# Patient Record
Sex: MALE | Race: WHITE | NOT HISPANIC OR LATINO | Employment: FULL TIME | URBAN - NONMETROPOLITAN AREA
[De-identification: names, ages, dates, MRNs, and addresses within clinical notes are randomized per-mention and may not be internally consistent; named-entity substitution may affect disease eponyms.]

---

## 2024-04-04 ENCOUNTER — OFFICE VISIT (OUTPATIENT)
Dept: INTERNAL MEDICINE | Facility: CLINIC | Age: 34
End: 2024-04-04
Payer: COMMERCIAL

## 2024-04-04 VITALS
SYSTOLIC BLOOD PRESSURE: 119 MMHG | WEIGHT: 186.12 LBS | HEART RATE: 67 BPM | OXYGEN SATURATION: 99 % | DIASTOLIC BLOOD PRESSURE: 62 MMHG | BODY MASS INDEX: 26.06 KG/M2 | TEMPERATURE: 96.6 F | HEIGHT: 71 IN

## 2024-04-04 DIAGNOSIS — Z00.00 ANNUAL PHYSICAL EXAM: Primary | ICD-10-CM

## 2024-04-04 DIAGNOSIS — Z11.59 ENCOUNTER FOR HEPATITIS C SCREENING TEST FOR LOW RISK PATIENT: ICD-10-CM

## 2024-04-04 DIAGNOSIS — Z72.0 TOBACCO ABUSE: ICD-10-CM

## 2024-04-04 DIAGNOSIS — Z13.29 SCREENING FOR ENDOCRINE, METABOLIC AND IMMUNITY DISORDER: ICD-10-CM

## 2024-04-04 DIAGNOSIS — Z13.0 ENCOUNTER FOR SCREENING FOR DISEASES OF THE BLOOD AND BLOOD-FORMING ORGANS AND CERTAIN DISORDERS INVOLVING THE IMMUNE MECHANISM: ICD-10-CM

## 2024-04-04 DIAGNOSIS — Z13.228 SCREENING FOR ENDOCRINE, METABOLIC AND IMMUNITY DISORDER: ICD-10-CM

## 2024-04-04 DIAGNOSIS — B35.4 TINEA CORPORIS: ICD-10-CM

## 2024-04-04 DIAGNOSIS — E66.3 OVERWEIGHT (BMI 25.0-29.9): ICD-10-CM

## 2024-04-04 DIAGNOSIS — Z13.0 SCREENING FOR ENDOCRINE, METABOLIC AND IMMUNITY DISORDER: ICD-10-CM

## 2024-04-04 RX ORDER — CLOTRIMAZOLE 1 %
1 CREAM (GRAM) TOPICAL 2 TIMES DAILY
Qty: 85 G | Refills: 0 | Status: SHIPPED | OUTPATIENT
Start: 2024-04-04

## 2024-04-04 NOTE — PROGRESS NOTES
Subjective   Jose Lomax is a 34 y.o. male and is here for a comprehensive physical exam. The patient reports problems - rash .    HPI: here today for annual physical exam and complaints of rash.   Rash started about 2 weeks ago.   It is erythematous and spreads over bilateral lower extremities below the knees.   No recent travel, does not work in agriculture or exposed to any poison Ivy. Household contacts do not have a similar rash.   Denies pruritus, pain, drainage, and hx of similar rash.   He has not tried anything for this problem.   No other pertinent medical history.   He does use marijuana and vapes nicotine. No other illicit drug or alcohol use.     Health Habits:  Eye exam within last 2 years? Yes.   Dental exam every 6 months? No, will schedule.   Exercise habits: no structured exercise  Healthy diet? Typical american diet     The ASCVD Risk score (Leonora ADAMES, et al., 2019) failed to calculate for the following reasons:    The 2019 ASCVD risk score is only valid for ages 40 to 79      Do you take any herbs or supplements that were not prescribed by a doctor? no  Are you taking calcium supplements? No  Are you taking aspirin daily? No     History:  Patient receives prostate care here: Yes  Family history of prostate cancer: no.     reports being sexually active and has had partner(s) who are female.    The following portions of the patient's history were reviewed and updated as appropriate: He  has a past medical history of ADHD (attention deficit hyperactivity disorder) and Anxiety.  He does not have a problem list on file.  He  has a past surgical history that includes Tonsillectomy; Adenoidectomy; and [place] tooth extraction (Bilateral).  His family history includes No Known Problems in his father and mother.  He  reports that he has quit smoking. His smoking use included cigarettes. He started smoking about 17 years ago. He has a 16 pack-year smoking history. He has been exposed to tobacco  "smoke. He has never used smokeless tobacco. He reports current alcohol use. He reports current drug use. Drug: Marijuana.  No current outpatient medications on file.     No current facility-administered medications for this visit.     Objective   /62   Pulse 67   Temp 96.6 °F (35.9 °C) (Tympanic)   Ht 180.3 cm (71\")   Wt 84.4 kg (186 lb 1.9 oz)   SpO2 99%   BMI 25.96 kg/m²   Physical Exam  Vitals and nursing note reviewed.   Constitutional:       General: He is not in acute distress.     Appearance: Normal appearance.   HENT:      Right Ear: Tympanic membrane and ear canal normal.      Left Ear: Tympanic membrane and ear canal normal.      Nose: Nose normal.      Mouth/Throat:      Mouth: Mucous membranes are moist.      Pharynx: No posterior oropharyngeal erythema.   Eyes:      Extraocular Movements: Extraocular movements intact.      Right eye: No nystagmus.      Left eye: No nystagmus.      Conjunctiva/sclera: Conjunctivae normal.      Pupils: Pupils are equal, round, and reactive to light.   Neck:      Thyroid: No thyroid mass or thyromegaly.      Vascular: No carotid bruit.   Cardiovascular:      Rate and Rhythm: Normal rate and regular rhythm.      Pulses: Normal pulses.      Heart sounds: Normal heart sounds. No murmur heard.  Pulmonary:      Effort: Pulmonary effort is normal.      Breath sounds: Normal breath sounds. No wheezing.   Abdominal:      General: Bowel sounds are normal. There is no distension.      Palpations: Abdomen is soft.      Tenderness: There is no abdominal tenderness.      Hernia: No hernia is present.   Musculoskeletal:         General: No tenderness or deformity.      Cervical back: Normal range of motion and neck supple. No muscular tenderness.      Right lower leg: No edema.      Left lower leg: No edema.   Lymphadenopathy:      Head:      Right side of head: No submandibular, tonsillar, preauricular or posterior auricular adenopathy.      Left side of head: No " submandibular, tonsillar, preauricular or posterior auricular adenopathy.      Cervical: No cervical adenopathy.   Skin:     General: Skin is warm and dry.      Capillary Refill: Capillary refill takes less than 2 seconds.      Findings: Rash (erytheamtous plaques bilateral lower extremities below the knee with rolled border) present. No lesion.   Neurological:      General: No focal deficit present.      Mental Status: He is alert and oriented to person, place, and time.      Coordination: Coordination is intact.      Gait: Gait is intact.      Deep Tendon Reflexes: Reflexes normal.   Psychiatric:         Mood and Affect: Mood normal.         Behavior: Behavior normal.     Assessment & Plan   Healthy male exam.    Diagnosis Plan   1. Annual physical exam  Preventative maintenance discussed during visit and information provided in AVS.       2. Tinea corporis  CBC No Differential    Comprehensive metabolic panel    Lipid panel    Recommend clotrimazole BID until gone. Follow-up if not improving.       3. Tobacco abuse  Jose Lomax  reports that he has quit smoking. His smoking use included cigarettes. He started smoking about 17 years ago. He has a 16 pack-year smoking history. He has been exposed to tobacco smoke. He has never used smokeless tobacco. I have educated him on the risk of diseases from using tobacco products such as cancer, COPD, and heart disease.     I advised him to quit and he is not willing to quit.    I spent 4 minutes counseling the patient.             4. Overweight (BMI 25.0-29.9)  CBC No Differential    Comprehensive metabolic panel    Lipid panel    Healthy diet and exercise encourage.d       5. Screening for endocrine, metabolic and immunity disorder  CBC No Differential    Comprehensive metabolic panel    Lipid panel      6. Encounter for screening for diseases of the blood and blood-forming organs and certain disorders involving the immune mechanism  CBC No Differential     Comprehensive metabolic panel    Lipid panel      7. Encounter for hepatitis C screening test for low risk patient  CBC No Differential    Comprehensive metabolic panel    Lipid panel    Hepatitis C antibody          2. Patient Counseling:  --Nutrition: Stressed importance of moderation in sodium/caffeine intake, saturated fat and cholesterol, caloric balance, sufficient intake of fresh fruits, vegetables, fiber, calcium and iron.  --Discussed the issue of calcium supplement, and the daily use of baby aspirin if applicable.  --Exercise: Stressed the importance of regular exercise.   --Substance Abuse: Discussed cessation/primary prevention of tobacco (if applicable, alcohol, or other drug use (if applicable); driving or other dangerous activities under the influence; availability of treatment for abuse.    --Sexuality: Discussed sexually transmitted diseases, partner selection, use of condoms, avoidance of unintended pregnancy  and contraceptive alternatives.   --Injury prevention: Discussed safety belts, safety helmets, smoke detector, smoking near bedding or upholstery.   --Dental health: Discussed importance of regular tooth brushing, flossing, and dental visits.  --Immunizations reviewed.  --Discussed benefits of screening colonoscopy (if applicable).  --After hours service discussed with patient.  3. Discussed the patient's BMI with him.  The BMI is above average; BMI management plan is completed  4. Return in about 1 year (around 4/4/2025) for Annual.  HERBER Villanueva  04/04/2024  11:20 EDT

## 2024-04-05 LAB
ALBUMIN SERPL-MCNC: 4.7 G/DL (ref 3.5–5.2)
ALBUMIN/GLOB SERPL: 1.8 G/DL
ALP SERPL-CCNC: 72 U/L (ref 39–117)
ALT SERPL-CCNC: 14 U/L (ref 1–41)
AST SERPL-CCNC: 16 U/L (ref 1–40)
BILIRUB SERPL-MCNC: 0.4 MG/DL (ref 0–1.2)
BUN SERPL-MCNC: 11 MG/DL (ref 6–20)
BUN/CREAT SERPL: 8.9 (ref 7–25)
CALCIUM SERPL-MCNC: 9.5 MG/DL (ref 8.6–10.5)
CHLORIDE SERPL-SCNC: 104 MMOL/L (ref 98–107)
CHOLEST SERPL-MCNC: 204 MG/DL (ref 0–200)
CO2 SERPL-SCNC: 26.8 MMOL/L (ref 22–29)
CREAT SERPL-MCNC: 1.23 MG/DL (ref 0.76–1.27)
EGFRCR SERPLBLD CKD-EPI 2021: 80 ML/MIN/1.73
ERYTHROCYTE [DISTWIDTH] IN BLOOD BY AUTOMATED COUNT: 12.6 % (ref 12.3–15.4)
GLOBULIN SER CALC-MCNC: 2.6 GM/DL
GLUCOSE SERPL-MCNC: 94 MG/DL (ref 65–99)
HCT VFR BLD AUTO: 43.8 % (ref 37.5–51)
HCV IGG SERPL QL IA: NON REACTIVE
HDLC SERPL-MCNC: 42 MG/DL (ref 40–60)
HGB BLD-MCNC: 15 G/DL (ref 13–17.7)
LDLC SERPL CALC-MCNC: 147 MG/DL (ref 0–100)
MCH RBC QN AUTO: 30 PG (ref 26.6–33)
MCHC RBC AUTO-ENTMCNC: 34.2 G/DL (ref 31.5–35.7)
MCV RBC AUTO: 87.6 FL (ref 79–97)
PLATELET # BLD AUTO: 228 10*3/MM3 (ref 140–450)
POTASSIUM SERPL-SCNC: 4 MMOL/L (ref 3.5–5.2)
PROT SERPL-MCNC: 7.3 G/DL (ref 6–8.5)
RBC # BLD AUTO: 5 10*6/MM3 (ref 4.14–5.8)
SODIUM SERPL-SCNC: 141 MMOL/L (ref 136–145)
TRIGL SERPL-MCNC: 84 MG/DL (ref 0–150)
VLDLC SERPL CALC-MCNC: 15 MG/DL (ref 5–40)
WBC # BLD AUTO: 6.78 10*3/MM3 (ref 3.4–10.8)

## 2024-05-02 ENCOUNTER — TELEPHONE (OUTPATIENT)
Dept: INTERNAL MEDICINE | Facility: CLINIC | Age: 34
End: 2024-05-02
Payer: COMMERCIAL

## 2024-05-02 RX ORDER — CLOTRIMAZOLE 1 %
1 CREAM (GRAM) TOPICAL 2 TIMES DAILY
Qty: 85 G | Refills: 0 | Status: SHIPPED | OUTPATIENT
Start: 2024-05-02

## 2024-05-02 NOTE — TELEPHONE ENCOUNTER
Caller: Jose Lomax    Relationship: Self    Best call back number: [phone]    Requested Prescriptions:   Requested Prescriptions      No prescriptions requested or ordered in this encounter      clotrimazole (LOTRIMIN) 1 % cream     Pharmacy where request should be sent:      Last office visit with prescribing clinician: 4/4/2024   Last telemedicine visit with prescribing clinician: Visit date not found   Next office visit with prescribing clinician: Visit date not found     Additional details provided by patient:     WAS SENT IN 4/4/24 PATIENT FORGOT ABOUT.  CAN WE SEND BACK IN BECAUSE PHARMACY DOES NOT HAVE THAT ORIGINAL REQUEST.  MYCHART STATES HE HAD RASH FOR TWO WEEKS BEFORE APPOINTMENT.  PATIENT STATES HE HAS HAD RASH SINCE AUGUST LAST YEAR    Does the patient have less than a 3 day supply:  [x] Yes  [] No    Would you like a call back once the refill request has been completed: [] Yes [x] No    If the office needs to give you a call back, can they leave a voicemail: [] Yes [x] No    Christine Cosme, PCT   05/02/24 14:25 EDT

## 2024-06-06 ENCOUNTER — OFFICE VISIT (OUTPATIENT)
Dept: INTERNAL MEDICINE | Facility: CLINIC | Age: 34
End: 2024-06-06
Payer: COMMERCIAL

## 2024-06-06 VITALS
OXYGEN SATURATION: 98 % | HEART RATE: 63 BPM | DIASTOLIC BLOOD PRESSURE: 68 MMHG | HEIGHT: 71 IN | SYSTOLIC BLOOD PRESSURE: 106 MMHG | RESPIRATION RATE: 16 BRPM | BODY MASS INDEX: 24.5 KG/M2 | WEIGHT: 175 LBS

## 2024-06-06 DIAGNOSIS — M62.89 PELVIC FLOOR DYSFUNCTION: ICD-10-CM

## 2024-06-06 DIAGNOSIS — L30.8 OTHER ECZEMA: Primary | ICD-10-CM

## 2024-06-06 PROBLEM — L30.9 ECZEMA: Status: ACTIVE | Noted: 2024-06-06

## 2024-06-06 PROCEDURE — 99214 OFFICE O/P EST MOD 30 MIN: CPT | Performed by: STUDENT IN AN ORGANIZED HEALTH CARE EDUCATION/TRAINING PROGRAM

## 2024-06-06 RX ORDER — TRIAMCINOLONE ACETONIDE 1 MG/G
1 CREAM TOPICAL 2 TIMES DAILY
Qty: 80 G | Refills: 0 | Status: SHIPPED | OUTPATIENT
Start: 2024-06-06

## 2024-06-06 NOTE — PROGRESS NOTES
"    Office Note     Name: Jose Lomax    : 1990     MRN: [id number]     Chief Complaint  Rash (Started on legs around September, spread to arms) and urinary problem    Subjective     History of Present Illness:  Jose Lomax is a 34 y.o. male who presents today for rash and urinary frequency.    Rash started in September from legs to arms, noted to be red and occasionally itching.  Urinary problem: Feel like he has weak stream upon urination, occasionally had urinary frequency but now has resolved. But no dysuria, urgency, pain or cramping.       Family History:   Family History   Problem Relation Age of Onset    No Known Problems Mother     No Known Problems Father        Social History:   Social History     Socioeconomic History    Marital status:    Tobacco Use    Smoking status: Former     Average packs/day: 1 pack/day for 16.0 years (16.0 ttl pk-yrs)     Types: Cigarettes     Start date:      Passive exposure: Past    Smokeless tobacco: Never   Vaping Use    Vaping status: Every Day    Substances: Nicotine    Devices: Disposable   Substance and Sexual Activity    Alcohol use: Yes    Drug use: Yes     Types: Marijuana    Sexual activity: Yes     Partners: Female       Health Maintenance   Topic Date Due    COVID-19 Vaccine ( - - season) 2025 (Originally 2023)    INFLUENZA VACCINE  2024    TDAP/TD VACCINES (2 - Td or Tdap) 2025    ANNUAL PHYSICAL  2025    BMI FOLLOWUP  2025    HEPATITIS C SCREENING  Completed    Pneumococcal Vaccine 0-64  Aged Out       Objective     Vital Signs  /68   Pulse 63   Resp 16   Ht 180.3 cm (70.98\")   Wt 79.4 kg (175 lb)   SpO2 98%   BMI 24.42 kg/m²   Estimated body mass index is 24.42 kg/m² as calculated from the following:    Height as of this encounter: 180.3 cm (70.98\").    Weight as of this encounter: 79.4 kg (175 lb).  BMI is within normal parameters. No other follow-up for BMI required.    Physical " Exam  Vitals reviewed.   HENT:      Head: Normocephalic.   Genitourinary:     Comments: No CVA tenderness  Musculoskeletal:      Comments: Multiple matches and papules on both lower legs and some on arms, prupritic   Neurological:      Mental Status: He is alert.          Procedures     Assessment and Plan     Diagnoses and all orders for this visit:    1. Other eczema (Primary)  Assessment & Plan:  Start triamcinolone cream on extremities    Orders:  -     triamcinolone (KENALOG) 0.1 % cream; Apply 1 Application topically to the appropriate area as directed 2 (Two) Times a Day.  Dispense: 80 g; Refill: 0    2. Pelvic floor dysfunction  Assessment & Plan:  Given patient's age, unlikely to be prostate related  Given chronicity and absence of systemic symptoms, unlikely to be an infection.   Offered physical therapy for pelvic floor dysfunction, pt refused           Counseling was given to patient for the following topics: instructions for management, risks and benefits of treatment options, and importance of treatment compliance.    Follow Up  No follow-ups on file.    MD DAINA Womack [facility]  [address] [place] [id number] [phone]

## 2024-06-06 NOTE — ASSESSMENT & PLAN NOTE
Given patient's age, unlikely to be prostate related  Given chronicity and absence of systemic symptoms, unlikely to be an infection.   Offered physical therapy for pelvic floor dysfunction, pt refused

## 2024-09-01 ENCOUNTER — HOSPITAL ENCOUNTER (EMERGENCY)
Facility: HOSPITAL | Age: 34
Discharge: HOME | End: 2024-09-01
Attending: STUDENT IN AN ORGANIZED HEALTH CARE EDUCATION/TRAINING PROGRAM
Payer: COMMERCIAL

## 2024-09-01 ENCOUNTER — APPOINTMENT (OUTPATIENT)
Dept: CT IMAGING | Facility: HOSPITAL | Age: 34
End: 2024-09-01
Payer: COMMERCIAL

## 2024-09-01 ENCOUNTER — APPOINTMENT (OUTPATIENT)
Dept: GENERAL RADIOLOGY | Facility: HOSPITAL | Age: 34
End: 2024-09-01
Payer: COMMERCIAL

## 2024-09-01 VITALS
WEIGHT: 170 LBS | SYSTOLIC BLOOD PRESSURE: 105 MMHG | RESPIRATION RATE: 16 BRPM | HEIGHT: 71 IN | OXYGEN SATURATION: 99 % | HEART RATE: 44 BPM | DIASTOLIC BLOOD PRESSURE: 64 MMHG | TEMPERATURE: 97.5 F | BODY MASS INDEX: 23.8 KG/M2

## 2024-09-01 DIAGNOSIS — M54.6 ACUTE LEFT-SIDED THORACIC BACK PAIN: Primary | ICD-10-CM

## 2024-09-01 DIAGNOSIS — R00.1 SINUS BRADYCARDIA: ICD-10-CM

## 2024-09-01 LAB
ALBUMIN SERPL-MCNC: 4.5 G/DL (ref 3.5–5.2)
ALBUMIN/GLOB SERPL: 1.6 G/DL
ALP SERPL-CCNC: 54 U/L (ref 39–117)
ALT SERPL W P-5'-P-CCNC: 10 U/L (ref 1–41)
ANION GAP SERPL CALCULATED.3IONS-SCNC: 8.7 MMOL/L (ref 5–15)
AST SERPL-CCNC: 14 U/L (ref 1–40)
BASOPHILS # BLD AUTO: 0.09 10*3/MM3 (ref 0–0.2)
BASOPHILS NFR BLD AUTO: 1 % (ref 0–1.5)
BILIRUB SERPL-MCNC: 0.3 MG/DL (ref 0–1.2)
BUN SERPL-MCNC: 13 MG/DL (ref 6–20)
BUN/CREAT SERPL: 11 (ref 7–25)
CALCIUM SPEC-SCNC: 9 MG/DL (ref 8.6–10.5)
CHLORIDE SERPL-SCNC: 101 MMOL/L (ref 98–107)
CO2 SERPL-SCNC: 26.3 MMOL/L (ref 22–29)
CREAT SERPL-MCNC: 1.18 MG/DL (ref 0.76–1.27)
DEPRECATED RDW RBC AUTO: 37.5 FL (ref 37–54)
EGFRCR SERPLBLD CKD-EPI 2021: 84.1 ML/MIN/1.73
EOSINOPHIL # BLD AUTO: 0.12 10*3/MM3 (ref 0–0.4)
EOSINOPHIL NFR BLD AUTO: 1.3 % (ref 0.3–6.2)
ERYTHROCYTE [DISTWIDTH] IN BLOOD BY AUTOMATED COUNT: 12.1 % (ref 12.3–15.4)
GLOBULIN UR ELPH-MCNC: 2.8 GM/DL
GLUCOSE SERPL-MCNC: 139 MG/DL (ref 65–99)
HCT VFR BLD AUTO: 40.7 % (ref 37.5–51)
HGB BLD-MCNC: 14.1 G/DL (ref 13–17.7)
HOLD SPECIMEN: NORMAL
HOLD SPECIMEN: NORMAL
IMM GRANULOCYTES # BLD AUTO: 0.02 10*3/MM3 (ref 0–0.05)
IMM GRANULOCYTES NFR BLD AUTO: 0.2 % (ref 0–0.5)
LYMPHOCYTES # BLD AUTO: 4.01 10*3/MM3 (ref 0.7–3.1)
LYMPHOCYTES NFR BLD AUTO: 44.5 % (ref 19.6–45.3)
MAGNESIUM SERPL-MCNC: 1.9 MG/DL (ref 1.6–2.6)
MCH RBC QN AUTO: 29.6 PG (ref 26.6–33)
MCHC RBC AUTO-ENTMCNC: 34.6 G/DL (ref 31.5–35.7)
MCV RBC AUTO: 85.3 FL (ref 79–97)
MONOCYTES # BLD AUTO: 0.81 10*3/MM3 (ref 0.1–0.9)
MONOCYTES NFR BLD AUTO: 9 % (ref 5–12)
NEUTROPHILS NFR BLD AUTO: 3.97 10*3/MM3 (ref 1.7–7)
NEUTROPHILS NFR BLD AUTO: 44 % (ref 42.7–76)
NRBC BLD AUTO-RTO: 0 /100 WBC (ref 0–0.2)
PLATELET # BLD AUTO: 254 10*3/MM3 (ref 140–450)
PMV BLD AUTO: 10.4 FL (ref 6–12)
POTASSIUM SERPL-SCNC: 3.4 MMOL/L (ref 3.5–5.2)
PROT SERPL-MCNC: 7.3 G/DL (ref 6–8.5)
RBC # BLD AUTO: 4.77 10*6/MM3 (ref 4.14–5.8)
SODIUM SERPL-SCNC: 136 MMOL/L (ref 136–145)
TROPONIN T SERPL HS-MCNC: <6 NG/L
WBC NRBC COR # BLD AUTO: 9.02 10*3/MM3 (ref 3.4–10.8)
WHOLE BLOOD HOLD COAG: NORMAL
WHOLE BLOOD HOLD SPECIMEN: NORMAL

## 2024-09-01 PROCEDURE — 83735 ASSAY OF MAGNESIUM: CPT | Performed by: STUDENT IN AN ORGANIZED HEALTH CARE EDUCATION/TRAINING PROGRAM

## 2024-09-01 PROCEDURE — 96374 THER/PROPH/DIAG INJ IV PUSH: CPT

## 2024-09-01 PROCEDURE — 72128 CT CHEST SPINE W/O DYE: CPT

## 2024-09-01 PROCEDURE — 71275 CT ANGIOGRAPHY CHEST: CPT

## 2024-09-01 PROCEDURE — 96361 HYDRATE IV INFUSION ADD-ON: CPT

## 2024-09-01 PROCEDURE — 80053 COMPREHEN METABOLIC PANEL: CPT | Performed by: STUDENT IN AN ORGANIZED HEALTH CARE EDUCATION/TRAINING PROGRAM

## 2024-09-01 PROCEDURE — 96375 TX/PRO/DX INJ NEW DRUG ADDON: CPT

## 2024-09-01 PROCEDURE — 25010000002 MORPHINE PER 10 MG: Performed by: STUDENT IN AN ORGANIZED HEALTH CARE EDUCATION/TRAINING PROGRAM

## 2024-09-01 PROCEDURE — 84484 ASSAY OF TROPONIN QUANT: CPT | Performed by: STUDENT IN AN ORGANIZED HEALTH CARE EDUCATION/TRAINING PROGRAM

## 2024-09-01 PROCEDURE — 25810000003 SODIUM CHLORIDE 0.9 % SOLUTION: Performed by: STUDENT IN AN ORGANIZED HEALTH CARE EDUCATION/TRAINING PROGRAM

## 2024-09-01 PROCEDURE — 93005 ELECTROCARDIOGRAM TRACING: CPT | Performed by: STUDENT IN AN ORGANIZED HEALTH CARE EDUCATION/TRAINING PROGRAM

## 2024-09-01 PROCEDURE — 71045 X-RAY EXAM CHEST 1 VIEW: CPT

## 2024-09-01 PROCEDURE — 85025 COMPLETE CBC W/AUTO DIFF WBC: CPT | Performed by: STUDENT IN AN ORGANIZED HEALTH CARE EDUCATION/TRAINING PROGRAM

## 2024-09-01 PROCEDURE — 25510000001 IOPAMIDOL 61 % SOLUTION: Performed by: STUDENT IN AN ORGANIZED HEALTH CARE EDUCATION/TRAINING PROGRAM

## 2024-09-01 PROCEDURE — 99285 EMERGENCY DEPT VISIT HI MDM: CPT

## 2024-09-01 PROCEDURE — 25010000002 ONDANSETRON PER 1 MG

## 2024-09-01 RX ORDER — METHOCARBAMOL 750 MG/1
750 TABLET, FILM COATED ORAL 3 TIMES DAILY PRN
Qty: 15 TABLET | Refills: 0 | Status: SHIPPED | OUTPATIENT
Start: 2024-09-01

## 2024-09-01 RX ORDER — IBUPROFEN 800 MG/1
800 TABLET, FILM COATED ORAL EVERY 8 HOURS PRN
Qty: 15 TABLET | Refills: 0 | Status: SHIPPED | OUTPATIENT
Start: 2024-09-01

## 2024-09-01 RX ORDER — IOPAMIDOL 612 MG/ML
100 INJECTION, SOLUTION INTRAVASCULAR
Status: COMPLETED | OUTPATIENT
Start: 2024-09-01 | End: 2024-09-01

## 2024-09-01 RX ORDER — ONDANSETRON 2 MG/ML
INJECTION INTRAMUSCULAR; INTRAVENOUS
Status: COMPLETED
Start: 2024-09-01 | End: 2024-09-01

## 2024-09-01 RX ORDER — SODIUM CHLORIDE 0.9 % (FLUSH) 0.9 %
10 SYRINGE (ML) INJECTION AS NEEDED
Status: DISCONTINUED | OUTPATIENT
Start: 2024-09-01 | End: 2024-09-01 | Stop reason: HOSPADM

## 2024-09-01 RX ORDER — ONDANSETRON 2 MG/ML
4 INJECTION INTRAMUSCULAR; INTRAVENOUS ONCE
Status: COMPLETED | OUTPATIENT
Start: 2024-09-01 | End: 2024-09-01

## 2024-09-01 RX ADMIN — SODIUM CHLORIDE 1000 ML: 9 INJECTION, SOLUTION INTRAVENOUS at 13:46

## 2024-09-01 RX ADMIN — ONDANSETRON 4 MG: 2 INJECTION INTRAMUSCULAR; INTRAVENOUS at 13:49

## 2024-09-01 RX ADMIN — IOPAMIDOL 100 ML: 612 INJECTION, SOLUTION INTRAVENOUS at 14:33

## 2024-09-01 RX ADMIN — MORPHINE SULFATE 4 MG: 4 INJECTION, SOLUTION INTRAMUSCULAR; INTRAVENOUS at 13:48

## 2024-09-01 NOTE — ED PROVIDER NOTES
"     [facility] EMERGENCY DEPARTMENT  Emergency Department Encounter  Emergency Medicine Physician Note       Pt Name: Jose Lomax  MRN: [id number]  Pt :   1990  Room Number:    Date of encounter:  2024  PCP: Brittany Lamb APRN  ED Physician: Justin Estrada DO    HPI:  Jose Lomax is a 34 y.o. male who presents to the ED with chief complaint of back pain.  Patient reports rather sudden onset of symptoms approximately 30 minutes prior to arrival.  Reports that he stood up and \"felt a twitch\" in his back and then developed pain.  Pain is located in the left upper thoracic back and occassional radiates to the left neck.  Rated severe.  Duration constant.  Hurts with movement.  Better with laying flat and not moving.    No fever, chills, chest pain, headache, slurred speech, numbness or weakness in extremities, abdominal pain, problems with urination or bowel movements, leg pain or swelling.    No stated past medical history.  No current medication use.  Vapes and uses marijuana daily.  Social drinker.  Works at [employer].    PAST MEDICAL HISTORY  Past Medical History:   Diagnosis Date    ADHD (attention deficit hyperactivity disorder)     Anxiety      Current Outpatient Medications   Medication Instructions    clotrimazole (LOTRIMIN) 1 % cream 1 Application, Topical, 2 Times Daily    ibuprofen (ADVIL,MOTRIN) 800 mg, Oral, Every 8 Hours PRN    methocarbamol (ROBAXIN) 750 mg, Oral, 3 Times Daily PRN    triamcinolone (KENALOG) 0.1 % cream 1 Application, Topical, 2 Times Daily      PAST SURGICAL HISTORY  Past Surgical History:   Procedure Laterality Date    ADENOIDECTOMY      TONSILLECTOMY      WISDOM TOOTH EXTRACTION Bilateral        FAMILY HISTORY  Family History   Problem Relation Age of Onset    No Known Problems Mother     No Known Problems Father        SOCIAL HISTORY  Social History     Socioeconomic History    Marital status:    Tobacco Use    Smoking status: Former "     Average packs/day: 1 pack/day for 16.0 years (16.0 ttl pk-yrs)     Types: Cigarettes     Start date: 2007     Passive exposure: Past    Smokeless tobacco: Never   Vaping Use    Vaping status: Every Day    Substances: Nicotine    Devices: Disposable   Substance and Sexual Activity    Alcohol use: Yes    Drug use: Yes     Types: Marijuana    Sexual activity: Yes     Partners: Female     ALLERGIES  Patient has no known allergies.    REVIEW OF SYSTEMS  All systems reviewed and negative except for those discussed in HPI.     PHYSICAL EXAM  ED Triage Vitals   Temp Heart Rate Resp BP SpO2   09/01/24 1313 09/01/24 1301 09/01/24 1301 09/01/24 1301 09/01/24 1301   97.5 °F (36.4 °C) (!) 47 20 96/70 98 %      Temp src Heart Rate Source Patient Position BP Location FiO2 (%)   -- 09/01/24 1308 -- -- --    Left;Radial        I have reviewed the triage vital signs and nursing notes.    General: Alert.  Nontoxic appearance.  No acute distress.  Head: Normocephalic.  Atraumatic.  Eyes: Pupils 2 mm.  PERRLA.  EOMI.  No scleral icterus.  Neck: Supple.  Full range of motion without pain.  No meningismus.  Cardiovascular: Regular rate and rhythm.  No murmurs.  No rubs.  2+ distal pulses bilaterally.  Respiratory: Equal breath sounds bilaterally.  No rales.  No rhonchi.  No wheezing.  GI: Abdomen is soft.  Nondistended.  Nontender to palpation.  No rebound.  No guarding.  No CVA tenderness.  MSK: No cervical, thoracic, lumbar midline tenderness or step-off.  Neurologic: Oriented x 3.  Speech intact without dysarthria or aphasia. Cranial nerves II-XII intact.  Strength 5/5 bilateral upper and lower extremities.  Sensation to touch grossly intact bilaterally.  No focal deficits.  No pronator drift.  Normal finger-nose test.  Skin: No erythema. No edema.  Psych: Normal mood and affect.     LAB RESULTS  Recent Results (from the past 24 hour(s))   Comprehensive Metabolic Panel    Collection Time: 09/01/24  1:14 PM    Specimen: Blood    Result Value Ref Range    Glucose 139 (H) 65 - 99 mg/dL    BUN 13 6 - 20 mg/dL    Creatinine 1.18 0.76 - 1.27 mg/dL    Sodium 136 136 - 145 mmol/L    Potassium 3.4 (L) 3.5 - 5.2 mmol/L    Chloride 101 98 - 107 mmol/L    CO2 26.3 22.0 - 29.0 mmol/L    Calcium 9.0 8.6 - 10.5 mg/dL    Total Protein 7.3 6.0 - 8.5 g/dL    Albumin 4.5 3.5 - 5.2 g/dL    ALT (SGPT) 10 1 - 41 U/L    AST (SGOT) 14 1 - 40 U/L    Alkaline Phosphatase 54 39 - 117 U/L    Total Bilirubin 0.3 0.0 - 1.2 mg/dL    Globulin 2.8 gm/dL    A/G Ratio 1.6 g/dL    BUN/Creatinine Ratio 11.0 7.0 - 25.0    Anion Gap 8.7 5.0 - 15.0 mmol/L    eGFR 84.1 >60.0 mL/min/1.73   Single High Sensitivity Troponin T    Collection Time: 09/01/24  1:14 PM    Specimen: Blood   Result Value Ref Range    HS Troponin T <6 <22 ng/L   Magnesium    Collection Time: 09/01/24  1:14 PM    Specimen: Blood   Result Value Ref Range    Magnesium 1.9 1.6 - 2.6 mg/dL   Green Top (Gel)    Collection Time: 09/01/24  1:14 PM   Result Value Ref Range    Extra Tube Hold for add-ons.    Lavender Top    Collection Time: 09/01/24  1:14 PM   Result Value Ref Range    Extra Tube hold for add-on    Gold Top - SST    Collection Time: 09/01/24  1:14 PM   Result Value Ref Range    Extra Tube Hold for add-ons.    Light Blue Top    Collection Time: 09/01/24  1:14 PM   Result Value Ref Range    Extra Tube Hold for add-ons.    CBC Auto Differential    Collection Time: 09/01/24  1:14 PM    Specimen: Blood   Result Value Ref Range    WBC 9.02 3.40 - 10.80 10*3/mm3    RBC 4.77 4.14 - 5.80 10*6/mm3    Hemoglobin 14.1 13.0 - 17.7 g/dL    Hematocrit 40.7 37.5 - 51.0 %    MCV 85.3 79.0 - 97.0 fL    MCH 29.6 26.6 - 33.0 pg    MCHC 34.6 31.5 - 35.7 g/dL    RDW 12.1 (L) 12.3 - 15.4 %    RDW-SD 37.5 37.0 - 54.0 fl    MPV 10.4 6.0 - 12.0 fL    Platelets 254 140 - 450 10*3/mm3    Neutrophil % 44.0 42.7 - 76.0 %    Lymphocyte % 44.5 19.6 - 45.3 %    Monocyte % 9.0 5.0 - 12.0 %    Eosinophil % 1.3 0.3 - 6.2 %    Basophil  % 1.0 0.0 - 1.5 %    Immature Grans % 0.2 0.0 - 0.5 %    Neutrophils, Absolute 3.97 1.70 - 7.00 10*3/mm3    Lymphocytes, Absolute 4.01 (H) 0.70 - 3.10 10*3/mm3    Monocytes, Absolute 0.81 0.10 - 0.90 10*3/mm3    Eosinophils, Absolute 0.12 0.00 - 0.40 10*3/mm3    Basophils, Absolute 0.09 0.00 - 0.20 10*3/mm3    Immature Grans, Absolute 0.02 0.00 - 0.05 10*3/mm3    nRBC 0.0 0.0 - 0.2 /100 WBC       RADIOLOGY  CT Angiogram Chest    Result Date: 9/1/2024  PROCEDURE: CT ANGIOGRAM CHEST-  HISTORY: Back pain  COMPARISON: None.  TECHNIQUE: The patient was injected with  IV contrast. Axial images were obtained through the chest in a CTA/ PE protocol. 3 D Reconstruction images were also performed. This study was performed with techniques to keep radiation doses as low as reasonably achievable, (ALARA). Individualized dose reduction techniques using automated exposure control or adjustment of mA and/or kV according to the patient size were employed.  FINDINGS: There is no axillary adenopathy. There is no hilar or mediastinal adenopathy.  The heart is proper size. There is no pericardial or pleural effusion. No filling defects are identified to suggest PE. There is no evidence of thoracic aortic aneurysm or dissection. Limited images of the upper abdomen demonstrate reflux of contrast into the IVC and hepatic veins. Right nephrolithiasis identified. No suspicious infiltrate or nodule is identified.      No evidence of pulmonary embolism, thoracic aortic aneurysm or dissection..  Right nephrolithiasis.   CTDI: 13.14 mGy DLP:642.31 mGy.cm  This report was signed and finalized on 9/1/2024 2:57 PM by Xochitl Cook MD.      CT Thoracic Spine Without Contrast    Result Date: 9/1/2024  PROCEDURE: CT THORACIC SPINE WO CONTRAST-  HISTORY: Back pain  COMPARISON: None.  PROCEDURE: Axial images were obtained from the skull base to the thoracic inlet by computed tomography. 3 D reconstruction images were performed. This study was performed  with techniques to keep radiation doses as low as reasonably achievable, (ALARA). Individualized dose reduction techniques using automated exposure control or adjustment of mA and/or kV according to the patient size were employed.  FINDINGS: There is no acute fracture or subluxation. No significant spinal or neural foraminal canal stenosis is seen. The disk spaces are preserved. The facets are normally aligned. The soft tissues are unremarkable. Limited images of the lung apices are unremarkable. Nonobstructing stone identified in the right kidney.      No acute fracture.  Right nephrolithiasis.   CTDI: 13.14 mGy DLP:642.31 mGy.cm  This report was signed and finalized on 9/1/2024 2:54 PM by Xochitl Cook MD.      XR Chest 1 View    Result Date: 9/1/2024  PROCEDURE: XR CHEST 1 VW-  HISTORY: Weak/Dizzy/AMS triage protocol, weakness, neck and back pain after feeling a twitch throughout body while picking up child.  COMPARISON: None.  FINDINGS: The heart is normal in size. The lungs are clear. The mediastinum is unremarkable. There is no pneumothorax.  There are no acute osseous abnormalities. Right costophrenic angle not included on this exam.      No acute cardiopulmonary process.      This report was signed and finalized on 9/1/2024 1:47 PM by Xochitl Cook MD.       PROCEDURES  Procedures    RISK STRATIFICATION    MEDICAL DECISION MAKING  34 y.o. male with past medical history listed above who presents with atraumatic thoracic back pain.    Vital signs in triage remarkable for bradycardia, heart rate in the 40s, soft blood pressure BP 96/70, otherwise within normal limits.    Based on clinical presentation and physical exam, differential diagnosis includes, but is not limited to, pneumothorax, thoracic aortic dissection, pulmonary embolism, MSK etiology. Less likely osseous abnormality. No clinical evidence of acute spinal cord compression.    I have discussed the indication, risk, and alternatives of the following  high risk medications: IV morphine    At least 3 different tests have been ordered on this patient.    Please see ED course below for my interpretation of the ED workup.  ED Course as of 09/01/24 1639   Sun Sep 01, 2024   1327 ECG 12 Lead Bradycardia  EKG per my interpretation sinus bradycardia, rate 44, normal axis, no ST segment elevation or depression, incomplete right bundle branch block, QRS duration 108 ms, normal QTc interval. [JS]   1327 XR Chest 1 View  I have independently reviewed and interpreted the chest x-ray.  My interpretation is negative for pneumothorax.   [JS]   1350 I reviewed the labs listed above. Clinically unremarkable.    Notable findings are highlighted below.    Old laboratory data was reviewed from the medical records and compared to today's results.   [JS]   1350 CBC & Differential(!) [JS]   1350 Comprehensive Metabolic Panel(!) [JS]   1350 Potassium(!): 3.4 [JS]   1350 Glucose(!): 139 [JS]   1350 Magnesium: 1.9 [JS]   1350 HS Troponin T: <6 [JS]   1636 I have independently reviewed and interpreted the imaging listed above.  My interpretations are listed below:    - CTA chest negative for thoracic aortic dissection or saddle PE.    -CT thoracic spine negative for fracture or traumatic malalignment.     [JS]   1636 Cardiac telemetry was reviewed and interpreted by me. Reveals per my interpretation sinus bradycardia at a rate of 47.   [JS]   1637 External notes reviewed. Heart rate on PCP notes from 04/04/24 and 06/06/2024 in the 60's. [JS]      ED Course User Index  [JS] Justin Estrada DO     Medications administered in ED:  Medications   sodium chloride 0.9 % flush 10 mL (has no administration in time range)   sodium chloride 0.9 % bolus 1,000 mL (0 mL Intravenous Stopped 9/1/24 1441)   morphine injection 4 mg (4 mg Intravenous Given 9/1/24 1348)   ondansetron (ZOFRAN) injection 4 mg (4 mg Intravenous Given 9/1/24 1349)   iopamidol (ISOVUE-300) 61 % injection 100 mL (100 mL Intravenous  Given 9/1/24 1433)     On re-evaluation, patient resting comfortably.  States symptoms have improved following therapy. Heart rate remained in the 40's. Vital signs and blood pressure remained stable on room air.  Patient was ambulatory in the ED with steady gait.  Able to tolerate oral intake appropriately.    I discussed the findings of the ED workup with the patient at bedside. Shared decision making discussed with the patient in regards to disposition.  Given heart rate in the 40s I did offer to admit the patient for medical observation, echocardiogram, and cardiology consultation. Patient was informed of the indication, benefits, alternative diagnostic options, and risks including, but not limited to missed and/or delayed diagnosis, therefore leading to failure to treat. After discussion patient politely declined admission. The patient is clinically not intoxicated, free from distracting pain, appears to have intact insight, judgment and reason and in my medical opinion has the capacity to make medical decisions.    I recommended outpatient follow-up with PCP/cardiology.  Patient was deemed medically stable for discharge with close outpatient follow-up and strict ED return precautions.     Home medications were reviewed.  Prescriptions for discharge: Ibuprofen, Robaxin    Chronic conditions affecting care: None    Social determinants of health impacting treatment or disposition: None    REPEAT VITAL SIGNS  AS OF 16:39 EDT VITALS:  BP - 105/64  HR - (!) 44  TEMP - 97.5 °F (36.4 °C)  O2 SATS - 99%    DIAGNOSIS  Final diagnoses:   Acute left-sided thoracic back pain   Sinus bradycardia     DISPOSITION  ED Disposition       ED Disposition   Discharge    Condition   Stable    Comment   --               PATIENT REFERRALS  [facility] CARDIOLOGY  [address]  [facility] [address] [phone]        Brittany Lamb, APRN  [address]  [address]  [facility] [place]note 2[place]  [id number]  [phone]      PCP. 48 hours.            Please note that portions of this document were completed with voice recognition software.        Justin Estrada DO  09/02/24 1124

## 2024-09-01 NOTE — Clinical Note
[facility] EMERGENCY DEPARTMENT  [address] [address]  Phone: [phone]    Jose Lomax was seen and treated in our emergency department on 9/1/2024.  He may return to work on 09/03/2024.         Thank you for choosing [facility].    Justin Estrada,

## 2024-09-16 ENCOUNTER — TELEPHONE (OUTPATIENT)
Dept: CARDIOLOGY | Facility: CLINIC | Age: 34
End: 2024-09-16
Payer: COMMERCIAL

## 2025-02-07 ENCOUNTER — OFFICE VISIT (OUTPATIENT)
Dept: INTERNAL MEDICINE | Facility: CLINIC | Age: 35
End: 2025-02-07
Payer: COMMERCIAL

## 2025-02-07 VITALS
WEIGHT: 163 LBS | DIASTOLIC BLOOD PRESSURE: 76 MMHG | RESPIRATION RATE: 16 BRPM | OXYGEN SATURATION: 97 % | TEMPERATURE: 97.8 F | SYSTOLIC BLOOD PRESSURE: 110 MMHG | HEIGHT: 71 IN | HEART RATE: 85 BPM | BODY MASS INDEX: 22.82 KG/M2

## 2025-02-07 DIAGNOSIS — F41.1 GENERALIZED ANXIETY DISORDER WITH PANIC ATTACKS: Primary | ICD-10-CM

## 2025-02-07 DIAGNOSIS — K40.90 UNILATERAL INGUINAL HERNIA WITHOUT OBSTRUCTION OR GANGRENE, RECURRENCE NOT SPECIFIED: ICD-10-CM

## 2025-02-07 DIAGNOSIS — F41.0 GENERALIZED ANXIETY DISORDER WITH PANIC ATTACKS: Primary | ICD-10-CM

## 2025-02-07 PROCEDURE — 99213 OFFICE O/P EST LOW 20 MIN: CPT | Performed by: NURSE PRACTITIONER

## 2025-02-07 NOTE — PROGRESS NOTES
"  Office Visit      Patient Name: Jose Lomax  : 1990   MRN: [id number]   Care Team: Patient Care Team:  Brittany Lamb APRN as PCP - General (Family Medicine)    Chief Complaint  Pelvic Pain (Only when coughing it hurts. ), Anxiety, and Panic Attack    Subjective     Subjective      Jose Lomax presents to [facility] for pain in right groin with cough x2 months.    States when he coughs he has to lean over to prevent the pain. Feels like something is popping out, but does not see any bulging under his skin. States he randomly gets sharps pains in his testicles and has been having issue with constipation recently. Pain has gotten better since being off work for paternity leave, but does still feel it.  Denies fevers, chills, dysuria, or straining for a bowel movement.     Has been having an increase in anxiety since child was born, states he sometimes feels like he is going to have a panic attack. Would like someone to talk to about this and how to cope. Denies suicidal/homicidal ideation.     Objective     Objective   Vital Signs:   /76   Pulse 85   Temp 97.8 °F (36.6 °C)   Resp 16   Ht 180.3 cm (71\")   Wt 73.9 kg (163 lb)   SpO2 97%   BMI 22.73 kg/m²     Physical Exam  Vitals and nursing note reviewed.   Constitutional:       Appearance: Normal appearance. He is not ill-appearing.   HENT:      Mouth/Throat:      Mouth: Mucous membranes are moist.      Pharynx: Oropharynx is clear.   Eyes:      Extraocular Movements: Extraocular movements intact.      Conjunctiva/sclera: Conjunctivae normal.      Pupils: Pupils are equal, round, and reactive to light.   Cardiovascular:      Rate and Rhythm: Normal rate and regular rhythm.      Pulses: Normal pulses.      Heart sounds: Normal heart sounds.   Pulmonary:      Effort: Pulmonary effort is normal.      Breath sounds: Normal breath sounds.   Abdominal:      General: Abdomen is flat. Bowel sounds are normal. "      Palpations: Abdomen is soft. There is no mass.      Tenderness: There is no abdominal tenderness. There is no guarding or rebound.   Genitourinary:     Testes:         Right: Tenderness (upon palpation) present.       Musculoskeletal:         General: Normal range of motion.   Skin:     General: Skin is warm and dry.      Coloration: Skin is not pale.      Findings: No rash.   Neurological:      General: No focal deficit present.      Mental Status: He is alert and oriented to person, place, and time.   Psychiatric:         Mood and Affect: Mood normal.         Behavior: Behavior normal.          Assessment / Plan      Assessment & Plan   Problem List Items Addressed This Visit    None  Visit Diagnoses       Generalized anxiety disorder with panic attacks    -  Primary    Relevant Orders    Ambulatory Referral to Behavioral Health    Referral sent for therapy. He will return in 2 weeks to discuss medication options if interested, if anxiety is worsening please make appointment sooner. If start to experience suicidal or homicidal thoughts, go to emergency department immediately. Recommend stress management, healthy diet, exercise as tolerated.     Unilateral inguinal hernia without obstruction or gangrene, recurrence not specified        Relevant Orders    US Nonvascular Extremity Limited    Imaging ordered. Do not lift greater than 10 pounds, and splint if needing to cough. Start OTC stool softeners to help in constipation and increase fluid intake. If having significant pain at site or a persistent bulging under skin go to emergency department immediately. Keep follow up for annual physical.      BMI is within normal parameters. No other follow-up for BMI required.  This note accurately reflects work and decisions made by me.    Follow Up   Return for Annual physical.  Patient was given instructions and counseling regarding his condition or for health maintenance advice. Please see specific information pulled  into the AVS if appropriate.     HERBER Cm  [facility]

## 2025-03-27 ENCOUNTER — HOSPITAL ENCOUNTER (OUTPATIENT)
Dept: ULTRASOUND IMAGING | Facility: HOSPITAL | Age: 35
Discharge: HOME | End: 2025-03-27
Admitting: NURSE PRACTITIONER
Payer: COMMERCIAL

## 2025-03-27 DIAGNOSIS — K40.90 UNILATERAL INGUINAL HERNIA WITHOUT OBSTRUCTION OR GANGRENE, RECURRENCE NOT SPECIFIED: ICD-10-CM

## 2025-03-27 PROCEDURE — 76882 US LMTD JT/FCL EVL NVASC XTR: CPT

## 2025-04-01 ENCOUNTER — OFFICE VISIT (OUTPATIENT)
Dept: BEHAVIORAL HEALTH | Facility: CLINIC | Age: 35
End: 2025-04-01
Payer: COMMERCIAL

## 2025-04-01 DIAGNOSIS — F32.A DEPRESSION, UNSPECIFIED DEPRESSION TYPE: ICD-10-CM

## 2025-04-01 DIAGNOSIS — F41.9 ANXIETY DISORDER, UNSPECIFIED TYPE: Primary | ICD-10-CM
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
## 2025-04-03 ENCOUNTER — OFFICE VISIT (OUTPATIENT)
Dept: INTERNAL MEDICINE | Facility: CLINIC | Age: 35
End: 2025-04-03
Payer: COMMERCIAL

## 2025-04-03 VITALS
BODY MASS INDEX: 22.82 KG/M2 | RESPIRATION RATE: 20 BRPM | WEIGHT: 163 LBS | TEMPERATURE: 97.1 F | HEART RATE: 56 BPM | DIASTOLIC BLOOD PRESSURE: 67 MMHG | SYSTOLIC BLOOD PRESSURE: 98 MMHG | OXYGEN SATURATION: 100 % | HEIGHT: 71 IN

## 2025-04-03 DIAGNOSIS — R10.31 RIGHT GROIN PAIN: ICD-10-CM

## 2025-04-03 DIAGNOSIS — Z87.442 HISTORY OF NEPHROLITHIASIS: Primary | ICD-10-CM

## 2025-04-03 LAB
BILIRUB BLD-MCNC: NEGATIVE MG/DL
CLARITY, POC: CLEAR
COLOR UR: YELLOW
EXPIRATION DATE: NORMAL
GLUCOSE UR STRIP-MCNC: NEGATIVE MG/DL
KETONES UR QL: NEGATIVE
LEUKOCYTE EST, POC: NEGATIVE
Lab: NORMAL
NITRITE UR-MCNC: NEGATIVE MG/ML
PH UR: 6 [PH] (ref 5–8)
PROT UR STRIP-MCNC: NEGATIVE MG/DL
RBC # UR STRIP: NEGATIVE /UL
SP GR UR: 1.02 (ref 1–1.03)
UROBILINOGEN UR QL: NORMAL

## 2025-04-03 NOTE — PROGRESS NOTES
"  Office Visit      Patient Name: Jose Lomax  : 1990   MRN: [id number]   Care Team: Patient Care Team:  Brittany Lamb APRN as PCP - General (Family Medicine)    Chief Complaint  Abdominal Pain (For about 4-5 months)    Subjective     Subjective      Jose Lomax presents to [facility] for groin pain.   Symptoms have been present 4-5 months at this time.   He underwent US which showed no abnormality including no hernia.   He continues to have intermittent pain with coughing and straining to have a bowel movement. Symptoms are worse when he is constipated.   There is no obvious bulge, color changes of the skin, or severe pain.   The pain is not effecting ADLs.   He has concerns of prostate. States his urine stream is not as strong as it used to be. He has had kidney stones in the past . About 6 months ago imaging showed non-obstructive stones on the right side.   Denies dysuria, hematuria, or flank pain.       Objective     Objective   Vital Signs:   BP 98/67   Pulse 56   Temp 97.1 °F (36.2 °C)   Resp 20   Ht 180.3 cm (70.98\")   Wt 73.9 kg (163 lb)   SpO2 100%   BMI 22.74 kg/m²         Physical Exam  Vitals and nursing note reviewed.   Constitutional:       Appearance: Normal appearance. He is normal weight. He is not ill-appearing.   Cardiovascular:      Rate and Rhythm: Normal rate and regular rhythm.      Heart sounds: Normal heart sounds. No murmur heard.  Pulmonary:      Effort: Pulmonary effort is normal.      Breath sounds: Normal breath sounds. No wheezing.   Abdominal:      General: Abdomen is flat. Bowel sounds are normal. There is no distension.      Palpations: Abdomen is soft.      Tenderness: There is no abdominal tenderness. There is no right CVA tenderness or left CVA tenderness.      Hernia: No hernia is present.   Skin:     General: Skin is warm and dry.      Findings: No rash.   Neurological:      General: No focal deficit present.      " Mental Status: He is alert and oriented to person, place, and time.   Psychiatric:         Mood and Affect: Mood normal.         Behavior: Behavior normal.          Assessment / Plan      Assessment & Plan   Problem List Items Addressed This Visit    None  Visit Diagnoses         History of nephrolithiasis    -  Primary    Relevant Orders    POCT urinalysis dipstick, automated (Completed)      Right groin pain        Relevant Orders    POCT urinalysis dipstick, automated (Completed)    Brief Urine Lab Results  (Last result in the past 365 days)        Color   Clarity   Blood   Leuk Est   Nitrite   Protein   CREAT   Urine HCG        04/03/25 1143 Yellow   Clear   Negative   Negative   Negative   Negative                 No reproducible symptoms on exam today. I discussed further investigation with CT vs watchful waiting and he agrees to watchful waiting at this time. Reassured UA is normal. Avoid constipation, trial of OTC miralax encouraged. Discussed red flag symptoms to be re-evaluated with.               BMI is within normal parameters. No other follow-up for BMI required.      Follow Up   Return if symptoms worsen or fail to improve.  Patient was given instructions and counseling regarding his condition or for health maintenance advice. Please see specific information pulled into the AVS if appropriate.     HERBER Cm  [facility]

## 2025-04-29 ENCOUNTER — OFFICE VISIT (OUTPATIENT)
Dept: BEHAVIORAL HEALTH | Facility: CLINIC | Age: 35
End: 2025-04-29
Payer: COMMERCIAL

## 2025-04-29 DIAGNOSIS — F32.A DEPRESSION, UNSPECIFIED DEPRESSION TYPE: ICD-10-CM

## 2025-04-29 DIAGNOSIS — F41.1 GENERALIZED ANXIETY DISORDER: Primary | ICD-10-CM

## 2025-04-29 NOTE — PROGRESS NOTES
"  Follow Up Note       Date Encounter: 2025   Name: Jose Lomax  MRN: [id number]  : 1990    Time In: 11:05  Time Out: 11:46     Referring Provider: Brittany Lamb APRN    Chief Complaint: (F41.1) Generalized anxiety disorder    (F32.A) Depression, unspecified depression type     History of Present Illness:   Jose Lomax is a 35 y.o. male who is being seen today for follow up for individual Psychotherapy session. Patient was on time for appointment. Patient was dressed appropriately. Patient was cooperative and consented to treatment. Therapist and patient completed check on from initial visit. Patient reported that he has been having panic attacks and increase in anxiety. Patient reported that his last panic attack was last week when trying to get to work. 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 Patient reported that he did not want to be late for work and not being able to drive due to shut down caused him to have a panic attack. Patient reported when tried to go another direction to get to work he  could feel his fingers starting to \" Draw up\" and he felt like things were closing in on him. Patient reports that when he has panic attacks everything starts racing, he becomes \"internally hot and itching\", then his fingers draw up, and then his legs draw up like in a fetal position. Patient reports if he is driving he has to pull over. Patient reports that there has been times his wife has had to come and pick him up. Patient reports these panic attacks happen when he is feeling completely overwhelmed. When asked what triggers these attacks,patient reported thinking he will be late for work and his finances. Patient was observed to be constantly fidgeting with his hand and crossing and uncrossing his legs during appointment. Patient reports his anxiety is always an 8 out of 10 with 10 being the worst and his depression is a constant 5 " out of 10 with 10 being the worst.Patient reports after he has panic attacks he is completely exhausted.  Patient reported history of ADHD. Patient stated that from the time he was 7 years old until 14 or 15 he was on Ritalin 1.5 tabs in the morning, at noon and 1 tab at 4:00pm. Patient reported he was on other medication along with the Ritalin, but can not remember the names. Patient reported when looking back, when he stopped the medication his behavior decreased, couldn't focus, and school grades fell.   Therapist and patient practiced guided imagery for relaxation. Paient reported coping skills at home are playing video games and the use of Marijuana. Patient denied having any suicidal thoughts, plans or intent. Patient denied having any self injurious behaviors. Patient reported he would like to have his [facility] paperwork filled out. Patient reported he wants to work, but does not want to get occurrences at work for tardy or for days when his anxiety is high. Therapist advised patient that he needs to see her one more time before paperwork could get filled out. Therapist also assisted patient with appointment for [facility].       Subjective     Assessment Scores:   Last PHQ-9 Score: 11    FRAN-7  Feeling nervous, anxious or on edge: More than half the days  Not being able to stop or control worrying: More than half the days  Worrying too much about different things: More than half the days  Trouble Relaxing: More than half the days  Being so restless that it is hard to sit still: More than half the days  Feeling afraid as if something awful might happen: More than half the days  Becoming easily annoyed or irritable: More than half the days  FRAN 7 Total Score: 14  If you checked any problems, how difficult have these problems made it for you to do your work, take care of things at home, or get along with other people: Somewhat difficult    Patient's Support Network  Includes:  wife    Medications:     Current Outpatient Medications:     ibuprofen (ADVIL,MOTRIN) 800 MG tablet, Take 1 tablet by mouth Every 8 (Eight) Hours As Needed for Mild Pain., Disp: 15 tablet, Rfl: 0    Allergies:   No Known Allergies     Objective       Mental Status Exam:   MENTAL STATUS EXAM   General Appearance:  Cleanly groomed and dressed  Eye Contact:  Good eye contact  Attitude:  Cooperative  Motor Activity:  Fidgety and restless  Muscle Strength:  Normal  Speech:  Normal rate, tone, volume  Language:  Other  Other Comment:  Appropriate  Mood and affect:  Anxious  Hopelessness:  Denies  Loneliness: Denies  Thought Process:  Logical  Associations/ Thought Content:  No delusions  Hallucinations:  None  Suicidal Ideations:  Not present  Homicidal Ideation:  Not present  Sensorium:  Alert  Orientation:  Person, place, time and situation  Immediate Recall, Recent, and Remote Memory:  Intact  Attention Span/ Concentration:  Good  Fund of Knowledge:  Appropriate for age and educational level  Intellectual Functioning:  Average range  Insight:  Good  Judgement:  Good  Reliability:  Good  Impulse Control:  Good       Assessment / Plan      Visit Diagnosis/Orders Placed This Visit:    ICD-10-CM ICD-9-CM   1. Generalized anxiety disorder  F41.1 300.02   2. Depression, unspecified depression type  F32.A 311        PLAN:     Prognosis: Good with ongoing treatment    Safety: Patient denies SI/HI.  Therapist and patient reviewed options for help including 915, 738 the suicide hotline, and going to the neared ER.  Therapist will continue to monitor.   Risk Assessment: Risk of self-harm acutely is low. Risk of self-harm chronically is also low, but could be further elevated in the event of treatment noncompliance and/or AODA.    Treatment Plan/Goals: Continue supportive psychotherapy efforts and medications as indicated. Treatment and medication options discussed during today's visit. Patient acknowledged and verbally  consented to continue with current treatment plan and was educated on the importance of compliance with treatment and follow-up appointments. Patient seems reasonably able to adhere to treatment plan.      Assisted Patient in processing above session content; acknowledged and normalized patient’s thoughts, feelings, and concerns.      Allowed Patient to freely discuss issues  without interruption or judgement with unconditional positive regard, active listening skills, and empathy. Therapist provided a safe, confidential environment to facilitate the development of a positive therapeutic relationship and encouraged open, honest communication. Assisted Patient in identifying risk factors which would indicate the need for higher level of care including thoughts to harm self or others and/or self-harming behavior and encouraged Patient to contact this office, call [phone], or present to the nearest emergency room should any of these events occur. Discussed crisis intervention services and means to access. Patient adamantly and convincingly denies current suicidal or homicidal ideation or perceptual disturbance. Assisted Patient in processing session content; acknowledged and normalized Patient’s thoughts, feelings, and concerns by utilizing a person-centered approach in efforts to build appropriate rapport and a positive therapeutic relationship with open and honest communication.     Follow Up:   Return in about 1 week (around 5/6/2025).       Chandra Matamoros [facility]   [facility]

## 2025-05-05 ENCOUNTER — OFFICE VISIT (OUTPATIENT)
Age: 35
End: 2025-05-05
Payer: COMMERCIAL

## 2025-05-05 DIAGNOSIS — F41.1 GENERALIZED ANXIETY DISORDER: Primary | ICD-10-CM

## 2025-05-05 DIAGNOSIS — F32.A DEPRESSION, UNSPECIFIED DEPRESSION TYPE: ICD-10-CM

## 2025-05-05 PROCEDURE — 90834 PSYTX W PT 45 MINUTES: CPT | Performed by: COUNSELOR

## 2025-05-05 NOTE — PROGRESS NOTES
Follow Up Note       Date Encounter: 2025   Name: Jose Lomax  MRN: [id number]  : 1990    Time In: 10:45  Time Out: 11:30     Referring Provider: Brittany Lamb APRN    Chief Complaint: (F41.1) Generalized anxiety disorder    (F32.A) Depression, unspecified depression type     History of Present Illness:   Jose Lomax is a 35 y.o. male who is being seen today for follow up for individual Psychotherapy session. Patient arrived on time for appointment. Patient was dressed appropriately. Patient was cooperative and consented to treatment. Therapist and patient completed check in, reviewed care plan and made a treatment plan. Patient reported that his anxiety at his time is 3-4 out of 10 with 10 being the worst and depression is a 6 out of 10 with 10 being the worst. Therapist asked patient to elaborated on why he rated his anxiety and depression as he did. Patient reported that his anxiety was due to the drive to the [place] office. Patient reported his depression is a 6 due to finances. Patient reports that he wants to be able to purchase a home for his family but is unable at this time and it make him depressed and overwhelmed. Patient reported that he has had 5 episodes with anxiety/irritability/panic since last appointment. Patient considers these episode stemming from untreated ADHD. Patient has appointment with [facility] on 2025. Therapist and patient worked on REACT. Recognizing thoughts, Evaluating those thoughts, finding alternative thoughts, using coping skills and thought stopping. Patient denied having any suicidal thoughts, plans or intent.       Subjective     Assessment Scores:   Last PHQ-9 Score: 13    FRAN-7  Feeling nervous, anxious or on edge: Several days  Not being able to stop or control worrying: More than half the days  Worrying too much about different things: More than half the days  Trouble Relaxing: More than half the days  Being so restless  that it is hard to sit still: More than half the days  Feeling afraid as if something awful might happen: Several days  Becoming easily annoyed or irritable: Nearly every day  FRAN 7 Total Score: 13  If you checked any problems, how difficult have these problems made it for you to do your work, take care of things at home, or get along with other people: Somewhat difficult    Patient's Support Network Includes:  wife    Medications:     Current Outpatient Medications:     ibuprofen (ADVIL,MOTRIN) 800 MG tablet, Take 1 tablet by mouth Every 8 (Eight) Hours As Needed for Mild Pain., Disp: 15 tablet, Rfl: 0    Allergies:   No Known Allergies     Objective       Mental Status Exam:   MENTAL STATUS EXAM   General Appearance:  Cleanly groomed and dressed  Eye Contact:  Good eye contact  Attitude:  Cooperative  Motor Activity:  Fidgety  Muscle Strength:  Normal  Speech:  Normal rate, tone, volume  Language:  Other  Other Comment:  Appropriate  Mood and affect:  Normal, pleasant  Hopelessness:  Denies  Loneliness: Denies  Thought Process:  Logical  Associations/ Thought Content:  No delusions  Hallucinations:  None  Suicidal Ideations:  Not present  Homicidal Ideation:  Not present  Sensorium:  Alert  Orientation:  Person, place, time and situation  Immediate Recall, Recent, and Remote Memory:  Intact  Attention Span/ Concentration:  Good  Fund of Knowledge:  Appropriate for age and educational level  Intellectual Functioning:  Average range  Insight:  Good  Judgement:  Good  Reliability:  Good  Impulse Control:  Good       Assessment / Plan      Visit Diagnosis/Orders Placed This Visit:    ICD-10-CM ICD-9-CM   1. Generalized anxiety disorder  F41.1 300.02   2. Depression, unspecified depression type  F32.A 311        PLAN:     Prognosis: Good with ongoing treatment    Safety: Patient denies SI/HI.  Therapist and patient reviewed options for help including 120, 734 the suicide hotline, and going to the neared ER.  Therapist  will continue to monitor.   Risk Assessment: Risk of self-harm acutely is low. Risk of self-harm chronically is also low, but could be further elevated in the event of treatment noncompliance and/or AODA.    Treatment Plan/Goals: Continue supportive psychotherapy efforts and medications as indicated. Treatment and medication options discussed during today's visit. Patient acknowledged and verbally consented to continue with current treatment plan and was educated on the importance of compliance with treatment and follow-up appointments. Patient seems reasonably able to adhere to treatment plan.      Assisted Patient in processing above session content; acknowledged and normalized patient’s thoughts, feelings, and concerns.      Allowed Patient to freely discuss issues  without interruption or judgement with unconditional positive regard, active listening skills, and empathy. Therapist provided a safe, confidential environment to facilitate the development of a positive therapeutic relationship and encouraged open, honest communication. Assisted Patient in identifying risk factors which would indicate the need for higher level of care including thoughts to harm self or others and/or self-harming behavior and encouraged Patient to contact this office, call [phone], or present to the nearest emergency room should any of these events occur. Discussed crisis intervention services and means to access. Patient adamantly and convincingly denies current suicidal or homicidal ideation or perceptual disturbance. Assisted Patient in processing session content; acknowledged and normalized Patient’s thoughts, feelings, and concerns by utilizing a person-centered approach in efforts to build appropriate rapport and a positive therapeutic relationship with open and honest communication.     Follow Up:   Return in about 2 weeks (around 5/19/2025).       MARITZA Vaughan   [facility]

## 2025-05-05 NOTE — TREATMENT PLAN
Multi-Disciplinary Problems (from Behavioral Health Treatment Plan)      Active Problems       Problem: Anxiety  Start Date: 04/01/25      Problem Details: Patient 11 of FRAN-7            Goal Priority Start Date Expected End Date End Date    Patient will develop and implement behavioral and cognitive strategies to reduce anxiety and irrational fears. -- 04/01/25 09/30/25 --    Goal Details: Progress toward goal:  Not appropriate to rate progress toward goal since this is the initial treatment plan.        Goal Intervention Frequency Start Date End Date    Help patient explore past emotional issues in relation to present anxiety. Q Month 04/01/25 --    Intervention Details: Duration of treatment until discharged.        Goal Intervention Frequency Start Date End Date    Help patient develop an awareness of their cognitive and physical responses to anxiety. Q Month 04/01/25 --    Intervention Details: Duration of treatment until discharged.                Problem: Depression  Start Date: 04/01/25      Problem Details: Patient scored an 8 on PHQ-9          Goal Priority Start Date Expected End Date End Date    Patient will demonstrate the ability to initiate new constructive life skills outside of sessions on a consistent basis. -- 04/01/25 09/30/25 --    Goal Details: Progress toward goal:  Not appropriate to rate progress toward goal since this is the initial treatment plan.        Goal Intervention Frequency Start Date End Date    Assist patient in setting attainable activities of daily living goals. Q Month 04/01/25 --      Goal Intervention Frequency Start Date End Date    Provide education about depression Q Month 04/01/25 --    Intervention Details: Duration of treatment until discharged.        Goal Intervention Frequency Start Date End Date    Assist patient in developing healthy coping strategies. Q Month 04/01/25 --    Intervention Details: Duration of treatment until discharged.                         Reviewed By       Chandra Matamoros, [facility] 05/05/25 1143                     I have discussed and reviewed this treatment plan with the patient.

## 2025-05-13 ENCOUNTER — HOSPITAL ENCOUNTER (EMERGENCY)
Facility: HOSPITAL | Age: 35
Discharge: HOME | End: 2025-05-13
Attending: EMERGENCY MEDICINE | Admitting: EMERGENCY MEDICINE
Payer: COMMERCIAL

## 2025-05-13 VITALS
RESPIRATION RATE: 18 BRPM | OXYGEN SATURATION: 97 % | TEMPERATURE: 97.5 F | HEIGHT: 71 IN | HEART RATE: 85 BPM | BODY MASS INDEX: 22.65 KG/M2 | WEIGHT: 161.8 LBS | SYSTOLIC BLOOD PRESSURE: 116 MMHG | DIASTOLIC BLOOD PRESSURE: 74 MMHG

## 2025-05-13 DIAGNOSIS — K40.90 UNILATERAL INGUINAL HERNIA WITHOUT OBSTRUCTION OR GANGRENE, RECURRENCE NOT SPECIFIED: Primary | ICD-10-CM

## 2025-05-13 PROCEDURE — 99282 EMERGENCY DEPT VISIT SF MDM: CPT | Performed by: EMERGENCY MEDICINE

## 2025-05-13 NOTE — ED PROVIDER NOTES
EMERGENCY DEPARTMENT ENCOUNTER    Pt Name: Jose Lomax  MRN: [id number]  Pt :   1990  Room Number:    Date of encounter:  2025  PCP: Brittany Lamb APRN  ED Provider: Rodo Orta MD    Historian: Patient      HPI:  Chief Complaint: Right groin pain        Context: Jose Lomax is a 35 y.o. male who presents to the ED c/o right groin pain.  Patient says over the past 6 months he has been intermittently had right groin pain.  He says that it is usually worse after coughing and straining.  Denies any associated testicular pain.  No vomiting.  No fever.      PAST MEDICAL HISTORY  Past Medical History:   Diagnosis Date    ADHD (attention deficit hyperactivity disorder)     Anxiety          PAST SURGICAL HISTORY  Past Surgical History:   Procedure Laterality Date    ADENOIDECTOMY      TONSILLECTOMY      WISDOM TOOTH EXTRACTION Bilateral          FAMILY HISTORY  Family History   Problem Relation Age of Onset    No Known Problems Mother     No Known Problems Father          SOCIAL HISTORY  Social History     Socioeconomic History    Marital status:    Tobacco Use    Smoking status: Former     Current packs/day: 0.00     Average packs/day: 1 pack/day for 16.0 years (16.0 ttl pk-yrs)     Types: Cigarettes     Start date:      Quit date:      Years since quittin.3     Passive exposure: Past    Smokeless tobacco: Never   Vaping Use    Vaping status: Every Day    Substances: Nicotine    Devices: Disposable   Substance and Sexual Activity    Alcohol use: Yes     Comment: socially    Drug use: Yes     Types: Marijuana    Sexual activity: Yes     Partners: Female         ALLERGIES  Patient has no known allergies.        REVIEW OF SYSTEMS    All systems reviewed and negative except for those discussed in HPI.       PHYSICAL EXAM    I have reviewed the triage vital signs and nursing notes.    ED Triage Vitals [25 0454]   Temp Heart Rate Resp BP SpO2   97.5 °F (36.4 °C) 85 18  116/74 97 %      Temp src Heart Rate Source Patient Position BP Location FiO2 (%)   Oral -- Sitting Left arm --       General: no acute distress, well-appearing, non-toxic  Skin: normal color, warm and dry  Head: normocephalic, atraumatic  Eyes: Pupils equally round and reactive to light.  Nose: normal nasal mucosa, no visible deformity.  Mouth: moist mucous membranes.  Neck: supple.  Chest: no retractions, no visible deformity  Cardiovascular: Regular rate and rhythm.  Lungs: clear to auscultation bilaterally.  Abdomen: soft, non-tender, non-distended. No rebound tenderness, no guarding.  No peritonitis.  Easily reducible right inguinal hernia with no overlying skin changes.  Genitourinary: Normal testicular lie.  No palpable hernia within the scrotum.  No palpable testicular tenderness or palpable masses bilaterally.  Neuro:  alert and oriented x3, no focal neurological deficits.  Psych:  appropriate mood and behavior.      LAB RESULTS  No results found for this or any previous visit (from the past 24 hours).    If labs were ordered, I independently reviewed the results and considered them in treating the patient.  See medical decision making discussion section for my interpretation of lab results.        RADIOLOGY  No Radiology Exams Resulted Within Past 24 Hours        PROCEDURES    Procedures    No orders to display       MEDICATIONS GIVEN IN ER    Medications - No data to display      MEDICAL DECISION MAKING, PROGRESS, and CONSULTS    All labs, if obtained, have been independently reviewed by me.  All radiology studies, if obtained, have been reviewed by me and the radiologist dictating the report.  All EKG's, if obtained, have been independently viewed and interpreted by me/my attending physician.      Discussion below represents my analysis of pertinent findings related to patient's condition, differential diagnosis, treatment plan and final disposition.                         Differential  diagnosis:    Differential diagnosis for this patient includes hepatitis, cholangitis, cholecystitis, pancreatitis, gastritis, enteritis, colitis, gastroenteritis, appendicitis, volvulus, obstruction, ischemia, torsion, cystitis, pyelonephritis, nephrolithiasis, uretolithiasis, other acute emergency.    Medical Decision Making Discussion:    Vitals reviewed and are normal.    Clinically, patient has reducible right inguinal hernia.  I doubt intra-abdominal surgical pathology at this time as the hernia is reducible and there is no overlying skin changes.  Patient's abdomen is otherwise soft and completely nontender.  Patient will be referred to general surgery with instructions to return to the emerged department before then for overlying skin changes, persistent or worsening pain, vomiting, fever or for any concerning symptoms or new concerns.    Additional sources:    - External (non-ED) record review: Clinic note from May 5, 2025 documenting generalized anxiety disorder.    Shared Decision Making:  After my consideration of clinical presentation and any laboratory/radiology studies obtained, I discussed the findings with the patient/patient representative who is in agreement with the treatment plan and the final disposition.   Risks and benefits of discharge and/or observation/admission were discussed.    Orders placed during this visit:  Orders Placed This Encounter   Procedures    Ambulatory Referral to General Surgery (All Except Ian)       AS OF 06:13 EDT VITALS:    BP - 116/74  HR - 85  TEMP - 97.5 °F (36.4 °C) (Oral)  O2 SATS - 97%                  DIAGNOSIS  Final diagnoses:   Unilateral inguinal hernia without obstruction or gangrene, recurrence not specified         DISPOSITION  Discharge      Please note that portions of this document were completed with voice recognition software.        Rodo Orta MD  05/13/25 0684

## 2025-05-13 NOTE — DISCHARGE INSTRUCTIONS
Follow-up with general surgery regarding your right inguinal hernia.  Try to avoid heavy lifting and increase your dietary fiber to avoid straining with bowel movements.  Return to the emergency department for persistent or worsening abdominal pain, fever, vomiting, decreased appetite or for any concerning symptoms, worsening symptoms or new concerns.

## 2025-05-13 NOTE — Clinical Note
[facility] EMERGENCY DEPARTMENT  [address] [address]  Phone: [phone]    Jose Lomax was seen and treated in our emergency department on 5/13/2025.  He may return to work on 05/14/2025.  Try to avoid heavy lifting until surgery follow-up.       Thank you for choosing [facility].    Rodo Orta MD

## 2025-05-14 ENCOUNTER — OFFICE VISIT (OUTPATIENT)
Dept: SURGERY | Facility: CLINIC | Age: 35
End: 2025-05-14
Payer: COMMERCIAL

## 2025-05-14 ENCOUNTER — PATIENT ROUNDING (BHMG ONLY) (OUTPATIENT)
Dept: SURGERY | Facility: CLINIC | Age: 35
End: 2025-05-14

## 2025-05-14 VITALS
HEART RATE: 75 BPM | DIASTOLIC BLOOD PRESSURE: 72 MMHG | WEIGHT: 162 LBS | OXYGEN SATURATION: 98 % | SYSTOLIC BLOOD PRESSURE: 126 MMHG | BODY MASS INDEX: 22.68 KG/M2 | TEMPERATURE: 98.9 F | HEIGHT: 71 IN

## 2025-05-14 DIAGNOSIS — K40.90 UNILATERAL INGUINAL HERNIA WITHOUT OBSTRUCTION OR GANGRENE, RECURRENCE NOT SPECIFIED: Primary | ICD-10-CM

## 2025-05-14 NOTE — PROGRESS NOTES
Patient: Jose Lomax    YOB: 1990    Date: 05/14/2025    Primary Care Provider: Brittany Lamb APRN    Chief Complaint   Patient presents with    Groin Pain       SUBJECTIVE:    History of present illness:  I saw the patient in the office today as a consultation for evaluation and treatment of a right inguinal hernia.  He has had right groin pain for the past several months and now notices a palpable mass with prolonged standing or heavy lifting.    The following portions of the patient's history were reviewed and updated as appropriate: allergies, current medications, past family history, past medical history, past social history, past surgical history and problem list.      Review of Systems:  Constitutional:  Negative for chills, fever, and unexpected weight change.  HENT: Negative for trouble swallowing and voice change.  Eyes:  Negative for visual disturbance.  Respiratory:  Negative for apnea, cough, chest tightness, shortness of breath, and wheezing.  Cardiovascular:  Negative for chest pain, palpitations, and leg swelling.  Gastrointestinal:  Negative for abdominal distention, abdominal pain, anal bleeding, blood in stool, constipation, diarrhea, nausea, rectal pain, and vomiting.  Musculoskeletal:  Negative for back pain, gait problem, and joint swelling.  Skin:  Negative for color change, rash, and wound  Neurological:  Negative for dizziness, syncope, speech difficulty, weakness, numbness, and headaches.  Hematological:  Negative for adenopathy.  Does not bruise/bleed easily.  Psychiatric/Behavioral:  Negative for confusion.  The patient is not nervous/anxious.          History:  Past Medical History:   Diagnosis Date    ADHD (attention deficit hyperactivity disorder)     Anxiety     Asthma     Maria Elena had shortness of breath as long as i can remember    COPD (chronic obstructive pulmonary disease)     Kidney stone        Past Surgical History:   Procedure Laterality Date     "ADENOIDECTOMY      TONSILLECTOMY      WISDOM TOOTH EXTRACTION Bilateral        Family History   Problem Relation Age of Onset    No Known Problems Mother     No Known Problems Father     ADD / ADHD Sister         Add or adhd cant remeber which one    ADD / ADHD Brother         Add       Social History     Tobacco Use    Smoking status: Former     Current packs/day: 0.00     Average packs/day: 1 pack/day for 16.0 years (16.0 ttl pk-yrs)     Types: Cigarettes     Start date:      Quit date:      Years since quittin.3     Passive exposure: Past    Smokeless tobacco: Never   Vaping Use    Vaping status: Every Day    Substances: Nicotine    Devices: Disposable   Substance Use Topics    Alcohol use: Yes     Comment: socially    Drug use: Yes     Types: Marijuana       Allergies:  No Known Allergies    Medications:    Current Outpatient Medications:     ibuprofen (ADVIL,MOTRIN) 800 MG tablet, Take 1 tablet by mouth Every 8 (Eight) Hours As Needed for Mild Pain., Disp: 15 tablet, Rfl: 0    OBJECTIVE:    Vital Signs:   Vitals:    25 1249   BP: 126/72   Pulse: 75   Temp: 98.9 °F (37.2 °C)   TempSrc: Infrared   SpO2: 98%   Weight: 73.5 kg (162 lb)   Height: 180.3 cm (70.98\")         Physical Exam:     General Appearance:    Alert, cooperative, in no acute distress   Head:    Normocephalic, without obvious abnormality, atraumatic   Eyes:            Lids and lashes normal, conjunctivae and sclerae normal, no   icterus, no pallor, corneas clear, PERRLA   Ears:    Ears appear intact with no abnormalities noted   Throat:   No oral lesions, no thrush, oral mucosa moist   Neck:   No adenopathy, supple, trachea midline, no thyromegaly, no   carotid bruit, no JVD   Back:     No kyphosis present, no scoliosis present, no skin lesions,      erythema or scars, no tenderness to percussion or                   palpation,   range of motion normal   Lungs:     Clear to auscultation,respirations regular, even and              "     unlabored    Heart:    Regular rhythm and normal rate, normal S1 and S2, no            murmur, no gallop, no rub, no click   Chest Wall:    No abnormalities observed   Abdomen:     Normal bowel sounds, no masses, no organomegaly, soft        non-tender, non-distended, no guarding, reducible right inguinal hernia   Extremities:   Moves all extremities well, no edema, no cyanosis, no             redness   Pulses:   Pulses palpable and equal bilaterally   Skin:   No bleeding, bruising or rash   Lymph nodes:   No palpable adenopathy   Neurologic:   Cranial nerves 2 - 12 grossly intact, sensation intact, DTR       present and equal bilaterally         Results Review:   I reviewed the patient's new clinical results.  I reviewed the patient's new imaging results and agree with the interpretation.  I reviewed the patient's other test results and agree with the interpretation    Review of Systems was reviewed and confirmed as accurate as documented by the MA.    ASSESSMENT/PLAN:    1. Unilateral inguinal hernia without obstruction or gangrene, recurrence not specified        I had a detailed and extensive discussion with the patient in the office and they understand that they need to undergo robotic laparoscopic assisted right inguinal/femoral hernia repair with mesh.  Full risks and benefits of operative versus nonoperative intervention were discussed with the patient and these included things such as nonresolution of symptoms and possible worsening of symptoms without surgical intervention versus infection, bleeding, possible recurrent hernia, possible postoperative neuralgia from nerve damage or involvement with scar tissue, etc.  The patient understands, agrees, and had no questions for me at the end of the office visit.     I discussed the patients findings and my recommendations with patient.    It is interesting that we did perform a right inguinal ultrasound today and there was a suspicion of the possibility of a  right femoral hernia.    Electronically signed by Alphonso Chaparro MD  05/14/25

## 2025-05-14 NOTE — PROGRESS NOTES
May 14, 2025    Hello, may I speak with Jose Lomax?    My name is JOSEPH      I am  with [facility] GENERAL SURGERY  [address]  [place] [address] [phone].    Before we get started may I verify your date of birth? 1990    I am calling to officially welcome you to our practice and ask about your recent visit. Is this a good time to talk? yes    Tell me about your visit with us. What things went well?  GOOD VISIT HE FOUND MY PROBLEM SO GLAD       We're always looking for ways to make our patients' experiences even better. Do you have recommendations on ways we may improve?  no    Overall were you satisfied with your first visit to our practice? yes       I appreciate you taking the time to speak with me today. Is there anything else I can do for you? no      Thank you, and have a great day.

## 2025-05-16 ENCOUNTER — PRE-ADMISSION TESTING (OUTPATIENT)
Dept: PREADMISSION TESTING | Facility: HOSPITAL | Age: 35
End: 2025-05-16
Payer: COMMERCIAL

## 2025-05-16 LAB
ANION GAP SERPL CALCULATED.3IONS-SCNC: 8.5 MMOL/L (ref 5–15)
BUN SERPL-MCNC: 12 MG/DL (ref 6–20)
BUN/CREAT SERPL: 10 (ref 7–25)
CALCIUM SPEC-SCNC: 9.2 MG/DL (ref 8.6–10.5)
CHLORIDE SERPL-SCNC: 100 MMOL/L (ref 98–107)
CO2 SERPL-SCNC: 26.5 MMOL/L (ref 22–29)
CREAT SERPL-MCNC: 1.2 MG/DL (ref 0.76–1.27)
DEPRECATED RDW RBC AUTO: 37.8 FL (ref 37–54)
EGFRCR SERPLBLD CKD-EPI 2021: 81.9 ML/MIN/1.73
ERYTHROCYTE [DISTWIDTH] IN BLOOD BY AUTOMATED COUNT: 11.9 % (ref 12.3–15.4)
GLUCOSE SERPL-MCNC: 91 MG/DL (ref 65–99)
HCT VFR BLD AUTO: 39.9 % (ref 37.5–51)
HGB BLD-MCNC: 13.8 G/DL (ref 13–17.7)
MCH RBC QN AUTO: 29.8 PG (ref 26.6–33)
MCHC RBC AUTO-ENTMCNC: 34.6 G/DL (ref 31.5–35.7)
MCV RBC AUTO: 86.2 FL (ref 79–97)
PLATELET # BLD AUTO: 196 10*3/MM3 (ref 140–450)
PMV BLD AUTO: 11 FL (ref 6–12)
POTASSIUM SERPL-SCNC: 4 MMOL/L (ref 3.5–5.2)
QT INTERVAL: 474 MS
QTC INTERVAL: 381 MS
RBC # BLD AUTO: 4.63 10*6/MM3 (ref 4.14–5.8)
SODIUM SERPL-SCNC: 135 MMOL/L (ref 136–145)
WBC NRBC COR # BLD AUTO: 6.55 10*3/MM3 (ref 3.4–10.8)

## 2025-05-16 PROCEDURE — 36415 COLL VENOUS BLD VENIPUNCTURE: CPT

## 2025-05-16 PROCEDURE — 80048 BASIC METABOLIC PNL TOTAL CA: CPT

## 2025-05-16 PROCEDURE — 85027 COMPLETE CBC AUTOMATED: CPT

## 2025-05-16 PROCEDURE — 93005 ELECTROCARDIOGRAM TRACING: CPT

## 2025-05-16 NOTE — DISCHARGE INSTRUCTIONS
Pre-Admission testing appointment completed today for patient's upcoming procedure here at [facility].    PAT PASS reviewed with patient and they verbalize understanding of the following:     Do not eat or drink anything after midnight the night before procedure unless otherwise instructed by physician/surgeon's office, this includes no gum, candy, mints, tobacco products or e-cigarettes.  Do not shave the area to be operated on at least 48 hours prior to procedure.  Do not wear makeup, lotion, hair products, or nail polish.  Do not wear any jewelry and remove all piercings.  Do not wear any adhesive if you wear dentures.  Do not wear contacts; bring in glasses if needed.  Only take medications on the morning of procedure as instructed by PAT nurse per anesthesia guidelines or as instructed by physician's office.  If you are on any blood thinners reach out to the physician/surgeon's office for instructions on when/if they will need to be stopped prior to procedure.  Bring in picture ID and insurance card, advanced directive copies if applicable, CPAP/BIPAP/Inhalers if indicated morning of procedure, leave any other valuables at home.  Ensure you have arranged for someone to drive you home the day of your procedure and someone to care for you at home afterwards. It is recommended that you do not drive, drink alcohol, or make any major legal decisions for at least 24 hours after your procedure is complete.  Chlorhexadine sponge along with instruction/information sheet given to patient. Readybath wipes given in lieu of CHG if patient has CHG allergy. Instructed patient to date, time, and initial the verification sheet once skin prep has been completed, and to return to [facility] the day of the procedure.  ERAS instructions given to patient unless otherwise instructed per surgeon's orders.     Anesthesia FAQ tip sheet given to patient.  Instructions and information given to patient about parking, hospital  entrance, and registration location.

## 2025-05-16 NOTE — PAT
"Called anesthesia phone and spoke with Michel Ross CRNA.  Informed that pt is scheduled for an upcoming procedure on 5/16/25 with Dr. Chaparro.  Pt's EKG reads \"marked sinus bradycardia, incomplete right bundle branch block.\"  Pt's heart rate is noted to be 39 on the EKG.  Took pt's pulse manually and left radial pulse is noted at 54 and 53 via BP machine.  Reviewed pt's past recorded pulses noted in the EMR with CRNA.  HR 85 on 5/13/25, 4/3/25 @ 56, 2/7/25 @ 85 and 9/1/24 @ 44.  On 9/1/94, pt was seen in the E.R. R/T back pain and was noted to be in sinus bradycardia and subsequently had an appointment with Dr. Muñoz (cardiology) on 9/9/24 and was a \"no show\" and again on 9/16 and was a \"no show\".  The pt stated that he didn't realize that he had this appointment and stated that he would call them to reschedule.  Pt denies any shortness of air or dizziness.  Pt states that he does feel tired today because he was up all night at the hospital with his child.  The pt states that he does have chest pain that is sharp and random.  Occurs about once a month and lasts a few seconds.  Pt states that he asked PCP about this in the past and was told that it was most likely related to COPD.  Per Michel, pt may proceed with surgery and will be evaluated by anesthesia DOS.  Informed pt regarding conversation with anesthesia.  Verbalized understanding.    "

## 2025-05-19 ENCOUNTER — ANESTHESIA (OUTPATIENT)
Dept: PERIOP | Facility: HOSPITAL | Age: 35
End: 2025-05-19
Payer: COMMERCIAL

## 2025-05-19 ENCOUNTER — ANESTHESIA EVENT (OUTPATIENT)
Dept: PERIOP | Facility: HOSPITAL | Age: 35
End: 2025-05-19
Payer: COMMERCIAL

## 2025-05-19 ENCOUNTER — HOSPITAL ENCOUNTER (OUTPATIENT)
Facility: HOSPITAL | Age: 35
Setting detail: HOSPITAL OUTPATIENT SURGERY
Discharge: HOME | End: 2025-05-19
Attending: SURGERY | Admitting: SURGERY
Payer: COMMERCIAL

## 2025-05-19 ENCOUNTER — HOSPITAL ENCOUNTER (EMERGENCY)
Facility: HOSPITAL | Age: 35
Discharge: HOME | End: 2025-05-19
Attending: EMERGENCY MEDICINE | Admitting: EMERGENCY MEDICINE
Payer: COMMERCIAL

## 2025-05-19 VITALS
TEMPERATURE: 98 F | OXYGEN SATURATION: 96 % | SYSTOLIC BLOOD PRESSURE: 127 MMHG | DIASTOLIC BLOOD PRESSURE: 69 MMHG | RESPIRATION RATE: 18 BRPM | HEART RATE: 52 BPM

## 2025-05-19 VITALS — SYSTOLIC BLOOD PRESSURE: 113 MMHG | DIASTOLIC BLOOD PRESSURE: 66 MMHG | OXYGEN SATURATION: 97 %

## 2025-05-19 DIAGNOSIS — K40.90 NON-RECURRENT UNILATERAL INGUINAL HERNIA WITHOUT OBSTRUCTION OR GANGRENE: Primary | ICD-10-CM

## 2025-05-19 DIAGNOSIS — R33.9 URINARY RETENTION: Primary | ICD-10-CM

## 2025-05-19 DIAGNOSIS — K40.90 UNILATERAL INGUINAL HERNIA WITHOUT OBSTRUCTION OR GANGRENE, RECURRENCE NOT SPECIFIED: ICD-10-CM

## 2025-05-19 PROCEDURE — 99282 EMERGENCY DEPT VISIT SF MDM: CPT

## 2025-05-19 PROCEDURE — 25010000002 ONDANSETRON PER 1 MG: Performed by: NURSE ANESTHETIST, CERTIFIED REGISTERED

## 2025-05-19 PROCEDURE — 25810000003 LACTATED RINGERS PER 1000 ML: Performed by: NURSE ANESTHETIST, CERTIFIED REGISTERED

## 2025-05-19 PROCEDURE — 25010000002 MEPERIDINE PER 100 MG: Performed by: NURSE ANESTHETIST, CERTIFIED REGISTERED

## 2025-05-19 PROCEDURE — 25010000002 BUPIVACAINE (PF) 0.5 % SOLUTION: Performed by: SURGERY

## 2025-05-19 PROCEDURE — 25010000002 HYDROMORPHONE PER 4 MG: Performed by: NURSE ANESTHETIST, CERTIFIED REGISTERED

## 2025-05-19 PROCEDURE — 25010000002 KETOROLAC TROMETHAMINE PER 15 MG: Performed by: NURSE ANESTHETIST, CERTIFIED REGISTERED

## 2025-05-19 PROCEDURE — 25010000002 LIDOCAINE (CARDIAC): Performed by: NURSE ANESTHETIST, CERTIFIED REGISTERED

## 2025-05-19 PROCEDURE — 25010000002 DEXAMETHASONE PER 1 MG: Performed by: NURSE ANESTHETIST, CERTIFIED REGISTERED

## 2025-05-19 PROCEDURE — 25010000002 CEFAZOLIN PER 500 MG: Performed by: SURGERY

## 2025-05-19 PROCEDURE — 49650 LAP ING HERNIA REPAIR INIT: CPT | Performed by: SURGERY

## 2025-05-19 PROCEDURE — C1781 MESH (IMPLANTABLE): HCPCS | Performed by: SURGERY

## 2025-05-19 PROCEDURE — 51798 US URINE CAPACITY MEASURE: CPT

## 2025-05-19 PROCEDURE — 99283 EMERGENCY DEPT VISIT LOW MDM: CPT | Performed by: EMERGENCY MEDICINE

## 2025-05-19 PROCEDURE — 25010000002 PROPOFOL 10 MG/ML EMULSION: Performed by: NURSE ANESTHETIST, CERTIFIED REGISTERED

## 2025-05-19 PROCEDURE — 51702 INSERT TEMP BLADDER CATH: CPT

## 2025-05-19 PROCEDURE — 25010000002 SUGAMMADEX 200 MG/2ML SOLUTION: Performed by: NURSE ANESTHETIST, CERTIFIED REGISTERED

## 2025-05-19 DEVICE — 3DMAX MID ANATOMICAL MESH, 12 CM X 17 CM (5" X 7"), EXTRA LARGE, RIGHT
Type: IMPLANTABLE DEVICE | Site: ABDOMEN | Status: FUNCTIONAL
Brand: 3DMAX

## 2025-05-19 DEVICE — DEV WND/CLS STRATAFIX SPIRALPDS PLS CT 2/0 15CM 26MM VIL: Type: IMPLANTABLE DEVICE | Site: ABDOMEN | Status: FUNCTIONAL

## 2025-05-19 RX ORDER — SCOPOLAMINE 1 MG/3D
1 PATCH, EXTENDED RELEASE TRANSDERMAL
Status: DISCONTINUED | OUTPATIENT
Start: 2025-05-19 | End: 2025-05-19 | Stop reason: HOSPADM

## 2025-05-19 RX ORDER — SUCCINYLCHOLINE/SOD CL,ISO/PF 200MG/10ML
SYRINGE (ML) INTRAVENOUS AS NEEDED
Status: DISCONTINUED | OUTPATIENT
Start: 2025-05-19 | End: 2025-05-19 | Stop reason: SURG

## 2025-05-19 RX ORDER — HYDROMORPHONE HYDROCHLORIDE 2 MG/ML
INJECTION, SOLUTION INTRAMUSCULAR; INTRAVENOUS; SUBCUTANEOUS AS NEEDED
Status: DISCONTINUED | OUTPATIENT
Start: 2025-05-19 | End: 2025-05-19 | Stop reason: SURG

## 2025-05-19 RX ORDER — HYDROCODONE BITARTRATE AND ACETAMINOPHEN 7.5; 325 MG/1; MG/1
1 TABLET ORAL EVERY 6 HOURS PRN
Qty: 15 TABLET | Refills: 0 | Status: SHIPPED | OUTPATIENT
Start: 2025-05-19

## 2025-05-19 RX ORDER — LIDOCAINE HYDROCHLORIDE 20 MG/ML
JELLY TOPICAL ONCE
Status: COMPLETED | OUTPATIENT
Start: 2025-05-19 | End: 2025-05-19

## 2025-05-19 RX ORDER — KETOROLAC TROMETHAMINE 30 MG/ML
INJECTION, SOLUTION INTRAMUSCULAR; INTRAVENOUS AS NEEDED
Status: DISCONTINUED | OUTPATIENT
Start: 2025-05-19 | End: 2025-05-19 | Stop reason: SURG

## 2025-05-19 RX ORDER — MEPERIDINE HYDROCHLORIDE 25 MG/ML
25 INJECTION INTRAMUSCULAR; INTRAVENOUS; SUBCUTANEOUS ONCE AS NEEDED
Status: COMPLETED | OUTPATIENT
Start: 2025-05-19 | End: 2025-05-19

## 2025-05-19 RX ORDER — ONDANSETRON 2 MG/ML
INJECTION INTRAMUSCULAR; INTRAVENOUS AS NEEDED
Status: DISCONTINUED | OUTPATIENT
Start: 2025-05-19 | End: 2025-05-19 | Stop reason: SURG

## 2025-05-19 RX ORDER — ROCURONIUM BROMIDE 50 MG/5 ML
SYRINGE (ML) INTRAVENOUS AS NEEDED
Status: DISCONTINUED | OUTPATIENT
Start: 2025-05-19 | End: 2025-05-19 | Stop reason: SURG

## 2025-05-19 RX ORDER — PROPOFOL 10 MG/ML
VIAL (ML) INTRAVENOUS AS NEEDED
Status: DISCONTINUED | OUTPATIENT
Start: 2025-05-19 | End: 2025-05-19 | Stop reason: SURG

## 2025-05-19 RX ORDER — ONDANSETRON 2 MG/ML
4 INJECTION INTRAMUSCULAR; INTRAVENOUS ONCE AS NEEDED
Status: COMPLETED | OUTPATIENT
Start: 2025-05-19 | End: 2025-05-19

## 2025-05-19 RX ORDER — DEXAMETHASONE SODIUM PHOSPHATE 4 MG/ML
INJECTION, SOLUTION INTRA-ARTICULAR; INTRALESIONAL; INTRAMUSCULAR; INTRAVENOUS; SOFT TISSUE AS NEEDED
Status: DISCONTINUED | OUTPATIENT
Start: 2025-05-19 | End: 2025-05-19 | Stop reason: SURG

## 2025-05-19 RX ORDER — MORPHINE SULFATE 2 MG/ML
2 INJECTION, SOLUTION INTRAMUSCULAR; INTRAVENOUS
Status: DISCONTINUED | OUTPATIENT
Start: 2025-05-19 | End: 2025-05-19 | Stop reason: HOSPADM

## 2025-05-19 RX ORDER — LORAZEPAM 2 MG/ML
1 INJECTION INTRAMUSCULAR ONCE AS NEEDED
Status: DISCONTINUED | OUTPATIENT
Start: 2025-05-19 | End: 2025-05-19 | Stop reason: HOSPADM

## 2025-05-19 RX ORDER — BUPIVACAINE HYDROCHLORIDE 5 MG/ML
INJECTION, SOLUTION EPIDURAL; INTRACAUDAL; PERINEURAL AS NEEDED
Status: DISCONTINUED | OUTPATIENT
Start: 2025-05-19 | End: 2025-05-19 | Stop reason: HOSPADM

## 2025-05-19 RX ORDER — SODIUM CHLORIDE, SODIUM LACTATE, POTASSIUM CHLORIDE, CALCIUM CHLORIDE 600; 310; 30; 20 MG/100ML; MG/100ML; MG/100ML; MG/100ML
INJECTION, SOLUTION INTRAVENOUS CONTINUOUS PRN
Status: DISCONTINUED | OUTPATIENT
Start: 2025-05-19 | End: 2025-05-19 | Stop reason: SURG

## 2025-05-19 RX ADMIN — SCOPOLAMINE 1 PATCH: 1.5 PATCH, EXTENDED RELEASE TRANSDERMAL at 16:27

## 2025-05-19 RX ADMIN — Medication 100 MG: at 13:03

## 2025-05-19 RX ADMIN — LIDOCAINE HYDROCHLORIDE 60 MG: 20 INJECTION, SOLUTION INTRAVENOUS at 13:03

## 2025-05-19 RX ADMIN — LIDOCAINE HYDROCHLORIDE: 20 JELLY TOPICAL at 22:08

## 2025-05-19 RX ADMIN — PROPOFOL 200 MG: 10 INJECTION, EMULSION INTRAVENOUS at 13:03

## 2025-05-19 RX ADMIN — SUGAMMADEX 200 MG: 100 INJECTION, SOLUTION INTRAVENOUS at 14:13

## 2025-05-19 RX ADMIN — HYDROMORPHONE HYDROCHLORIDE 2 MG: 2 INJECTION, SOLUTION INTRAMUSCULAR; INTRAVENOUS; SUBCUTANEOUS at 14:02

## 2025-05-19 RX ADMIN — SODIUM CHLORIDE, POTASSIUM CHLORIDE, SODIUM LACTATE AND CALCIUM CHLORIDE: 600; 310; 30; 20 INJECTION, SOLUTION INTRAVENOUS at 14:03

## 2025-05-19 RX ADMIN — Medication 50 MG: at 13:03

## 2025-05-19 RX ADMIN — KETOROLAC TROMETHAMINE 15 MG: 30 INJECTION, SOLUTION INTRAMUSCULAR at 14:07

## 2025-05-19 RX ADMIN — MEPERIDINE HYDROCHLORIDE 25 MG: 25 INJECTION INTRAMUSCULAR; INTRAVENOUS; SUBCUTANEOUS at 14:36

## 2025-05-19 RX ADMIN — ONDANSETRON 4 MG: 2 INJECTION INTRAMUSCULAR; INTRAVENOUS at 15:39

## 2025-05-19 RX ADMIN — ONDANSETRON 4 MG: 2 INJECTION INTRAMUSCULAR; INTRAVENOUS at 13:03

## 2025-05-19 RX ADMIN — SODIUM CHLORIDE, POTASSIUM CHLORIDE, SODIUM LACTATE AND CALCIUM CHLORIDE: 600; 310; 30; 20 INJECTION, SOLUTION INTRAVENOUS at 13:03

## 2025-05-19 RX ADMIN — SODIUM CHLORIDE 2000 MG: 9 INJECTION, SOLUTION INTRAVENOUS at 11:27

## 2025-05-19 RX ADMIN — DEXAMETHASONE SODIUM PHOSPHATE 4 MG: 4 INJECTION, SOLUTION INTRA-ARTICULAR; INTRALESIONAL; INTRAMUSCULAR; INTRAVENOUS; SOFT TISSUE at 13:03

## 2025-05-19 NOTE — ANESTHESIA PREPROCEDURE EVALUATION
Anesthesia Evaluation     Patient summary reviewed and Nursing notes reviewed   no history of anesthetic complications:   NPO Solid Status: > 8 hours  NPO Liquid Status: > 8 hours           Airway   Mallampati: I  TM distance: >3 FB  Neck ROM: full  No difficulty expected and Possible difficult intubation  Dental - normal exam     Pulmonary - normal exam   (+) a smoker Former, COPD,sleep apnea  Cardiovascular - normal exam  Exercise tolerance: good (4-7 METS)    ECG reviewed      ROS comment: EKG: SB RBBB    Neuro/Psych  (+) psychiatric history Anxiety, Depression and ADHD  GI/Hepatic/Renal/Endo    (+) renal disease- stones    Musculoskeletal     Abdominal  - normal exam    Bowel sounds: normal.   Substance History   (+) alcohol use, drug use (THC)     OB/GYN          Other        ROS/Med Hx Other: Labs revieAnxiety  ADHD (attention deficit hyperactivity disorder)  COPD (chronic obstructive pulmonary disease)  Kidney stone  Wears glasses  Chest pain  Sleep apnea  wed                Anesthesia Plan    ASA 2     general     (Risks and benefits discussed including risk of aspiration, recall and dental damage. All patient questions answered. Will continue with POC. )  intravenous induction     Pre-procedure education provided    CODE STATUS:

## 2025-05-19 NOTE — OP NOTE
PATIENT:    Jose Lomax    DATE OF SURGERY:   5/19/2025    PHYSICIAN:    Alphonso Chaparro MD    REFERRING PHYSICIAN:  Brittany Lamb, APRN    YOB: 1990    PREOPERATIVE DIAGNOSIS:  Right indirect inguinal hernia    POSTOPERATIVE DIAGNOSIS: Right indirect inguinal hernia    PROCEDURE: Robotic Right inguinal herniorrhaphy with 3-D Max Mid XLarge mesh    EBL:  Less than 50 cc    COMPLICATIONS:  None    HISTORY:  The patient presents to me for evaluation and treatment of a history significant for a right inguinal hernia.  They are here now for robotic repair with mesh.    ANESTHESIA:  General endotracheal.    OPERATIVE PROCEDURE:  The patient was taken to the operating room, placed in the supine position, and given general endotracheal anesthesia per the Anesthesia service.  The patient was prepped and draped in the normal sterile fashion and the patient was given preoperative IV antibiotics.  An appropriate timeout per the nursing staff was performed prior to the incision.  I did meet with the patient preoperatively and marked them accordingly.    I did make an incision in the midline approximately 4 cm superior to the umbilicus and then inserted a Veress needle to insufflate the abdomen with CO2.  An 8 mm Optiview robotic trocar was inserted carefully and the abdominal cavity was entered.  Under direct vision separate 8 mm right and left robotic trocars were inserted after incisions were made in these locations.  There was good visualization performed and there was evidence of a right indirect inguinal hernia.    Good exposure was obtained, we did score the peritoneum with robotic scissors and electrocautery and then brought down the peritoneal flap carefully dissecting in this avascular plane both laterally and medially.  We were able to identify the pubic tubercle and Darion's ligament, there was evidence of the above-mentioned hernia defect.  We continued our dissection laterally carefully  leaving fatty tissue on the abdominal wall in order to avoid nerve structures.  There was evidence of a lipoma, this was resected and removed.    We then were able to reduce the inguinal sac without difficulty, the spermatic cord structures were bluntly dissected laterally and the vas deferens was noted in the medial position.  We were careful to avoid getting close to the iliac vein or artery.  We carefully parietalized the cord structures and the vas deferens without difficulty, there was nice inferior mobilization of the peritoneum 2 cm inferior to Darion's ligament so that the mesh would not curl up.  After we had the sac dissected in its entirety and a nice flap of peritoneum inferiorly we were able to place the above-mentioned 3D max mid mesh in place without difficulty after inserting it through one of the lateral 8 mm trocars.    A single 3-0 Vicryl suture was used near the pubic tubercle to tack the mesh in place, there was excellent coverage of the above-mentioned hernia defect, the peritoneum was reapproximated with a running 2-0 Stratifix spiral suture without difficulty.  At this time the trocars were undocked, the instruments had previously been removed, and then the trocars were removed and 4-0 Vicryl subcuticular stitch and Steri-Strips were used for skin reapproximation.  Local Marcaine with epinephrine was used for postoperative anesthetic.    The patient was stable at this point in time and subsequently transferred back to the recovery room in stable condition.    PLAN:  I did have a detailed discussion with the patient's wife Shahrzad [phone], they understand the patient's current condition, the findings at the time of operative intervention, and the treatment plan.    Alphonso Chaparro MD

## 2025-05-19 NOTE — ANESTHESIA PROCEDURE NOTES
Airway  Reason: elective    Date/Time: 5/19/2025 1:03 PM  Airway not difficult    General Information and Staff    Patient location during procedure: OR  CRNA/CAA: Louis Sy CRNA    Indications and Patient Condition  Indications for airway management: airway protection    Preoxygenated: yes    Mask difficulty assessment: 1 - vent by mask    Final Airway Details    Final airway type: endotracheal airway      Successful airway: ETT  Cuffed: yes   Successful intubation technique: direct laryngoscopy  Endotracheal tube insertion site: oral  Blade: Ana  Blade size: 3  ETT size (mm): 7.5  Cormack-Lehane Classification: grade I - full view of glottis  Placement verified by: chest auscultation and capnometry   Measured from: lips  ETT/EBT  to lips (cm): 21  Number of attempts at approach: 1  Assessment: lips, teeth, and gum same as pre-op and atraumatic intubation

## 2025-05-19 NOTE — ANESTHESIA POSTPROCEDURE EVALUATION
Patient: Jose Lomax    Procedure Summary       Date: 05/19/25 Room / Location: [facility] OR 2 /  [facility] OR    Anesthesia Start: 1257 Anesthesia Stop: 1430    Procedure: INGUINAL HERNIA REPAIR LAPAROSCOPIC WITH DAVINCI ROBOT (Right: Abdomen) Diagnosis:       Unilateral inguinal hernia without obstruction or gangrene, recurrence not specified      (Unilateral inguinal hernia without obstruction or gangrene, recurrence not specified [K40.90])    Surgeons: Alphonso Chaparro MD Provider: Louis Sy CRNA    Anesthesia Type: general ASA Status: 2            Anesthesia Type: general    Vitals  Vitals Value Taken Time   BP     Temp     Pulse     Resp     SpO2 98 % 05/19/25 14:30   Vitals shown include unfiled device data.        Post Anesthesia Care and Evaluation    Patient location during evaluation: PACU  Patient participation: complete - patient participated  Level of consciousness: awake  Pain score: 3  Pain management: adequate    Airway patency: patent  Anesthetic complications: No anesthetic complications  PONV Status: controlled  Cardiovascular status: acceptable and stable  Respiratory status: acceptable and face mask  Hydration status: acceptable    Comments: SEE NURSING NOTES FOR POST OP VITAL SIGNS

## 2025-05-20 ENCOUNTER — HOSPITAL ENCOUNTER (EMERGENCY)
Facility: HOSPITAL | Age: 35
Discharge: HOME | End: 2025-05-20
Attending: STUDENT IN AN ORGANIZED HEALTH CARE EDUCATION/TRAINING PROGRAM | Admitting: STUDENT IN AN ORGANIZED HEALTH CARE EDUCATION/TRAINING PROGRAM
Payer: COMMERCIAL

## 2025-05-20 ENCOUNTER — TELEPHONE (OUTPATIENT)
Dept: SURGERY | Facility: CLINIC | Age: 35
End: 2025-05-20
Payer: COMMERCIAL

## 2025-05-20 VITALS
OXYGEN SATURATION: 96 % | DIASTOLIC BLOOD PRESSURE: 71 MMHG | RESPIRATION RATE: 16 BRPM | BODY MASS INDEX: 22.4 KG/M2 | HEIGHT: 71 IN | SYSTOLIC BLOOD PRESSURE: 111 MMHG | TEMPERATURE: 98.1 F | HEART RATE: 58 BPM | WEIGHT: 160 LBS

## 2025-05-20 DIAGNOSIS — L76.22 POSTPROCEDURAL HEMORRHAGE OF SKIN AND SUBCUTANEOUS TISSUE FOLLOWING OTHER PROCEDURE: Primary | ICD-10-CM

## 2025-05-20 PROCEDURE — 99282 EMERGENCY DEPT VISIT SF MDM: CPT | Performed by: STUDENT IN AN ORGANIZED HEALTH CARE EDUCATION/TRAINING PROGRAM

## 2025-05-20 NOTE — ED PROVIDER NOTES
Subjective:  History of Present Illness:    Patient is a 35-year-old male without contributing health history.  Presents to the ER today for surgical site evaluation.  Patient reports that he underwent an lingual hernia repair by Dr. Chaparro yesterday.  Reports that he was moving around at home.  One of the lap sites are oozing a little bit of blood.  He denies any other symptoms.  Denies OTC medication or home remedy.  Denies alleviating or exacerbating factors.    Nurses Notes reviewed and agree, including vitals, allergies, social history and prior medical history.     REVIEW OF SYSTEMS: All systems reviewed and not pertinent unless noted.  Review of Systems   Skin:  Positive for wound.   All other systems reviewed and are negative.      Past Medical History:   Diagnosis Date    ADHD (attention deficit hyperactivity disorder)     Anxiety     Chest pain     Sharp and francesca.  Occurs about once a month and lasts a few seconds.  Pt states that he asked PCP about this in the past and was told this was most likely related to COPD.    COPD (chronic obstructive pulmonary disease)     Kidney stone     Sleep apnea     No CPAP used after tonsils and adenoids removed    Wears glasses        Allergies:    Patient has no known allergies.      Past Surgical History:   Procedure Laterality Date    ADENOIDECTOMY      INGUINAL HERNIA REPAIR Right 2025    Procedure: INGUINAL HERNIA REPAIR LAPAROSCOPIC WITH DAVINCI ROBOT;  Surgeon: Alphonso Chaparro MD;  Location: [facility];  Service: Robotics - DaVinci;  Laterality: Right;    TONSILLECTOMY      WISDOM TOOTH EXTRACTION Bilateral          Social History     Socioeconomic History    Marital status:    Tobacco Use    Smoking status: Former     Current packs/day: 0.00     Average packs/day: 1 pack/day for 16.0 years (16.0 ttl pk-yrs)     Types: Cigarettes     Start date:      Quit date:      Years since quittin.3     Passive exposure: Past    Smokeless tobacco:  "Never   Vaping Use    Vaping status: Every Day    Substances: Nicotine    Devices: Disposable   Substance and Sexual Activity    Alcohol use: Yes     Comment: when out to eat usually    Drug use: Yes     Types: Marijuana     Comment: daily use of    Sexual activity: Defer         Family History   Problem Relation Age of Onset    No Known Problems Mother     No Known Problems Father     ADD / ADHD Sister         Add or adhd cant remeber which one    ADD / ADHD Brother         Add       Objective  Physical Exam:  /71 (BP Location: Left arm, Patient Position: Sitting)   Pulse 58   Temp 98.1 °F (36.7 °C) (Oral)   Resp 16   Ht 180.3 cm (71\")   Wt 72.6 kg (160 lb)   SpO2 96%   BMI 22.32 kg/m²      Physical Exam  Vitals and nursing note reviewed.   Constitutional:       Appearance: Normal appearance. He is normal weight.   HENT:      Head: Normocephalic and atraumatic.      Nose: Nose normal.      Mouth/Throat:      Mouth: Mucous membranes are moist.      Pharynx: Oropharynx is clear.   Eyes:      Extraocular Movements: Extraocular movements intact.      Conjunctiva/sclera: Conjunctivae normal.      Pupils: Pupils are equal, round, and reactive to light.   Cardiovascular:      Rate and Rhythm: Normal rate and regular rhythm.      Pulses: Normal pulses.      Heart sounds: Normal heart sounds.   Pulmonary:      Effort: Pulmonary effort is normal.      Breath sounds: Normal breath sounds.   Abdominal:      General: Abdomen is flat. Bowel sounds are normal.      Palpations: Abdomen is soft.   Musculoskeletal:         General: Normal range of motion.      Cervical back: Normal range of motion and neck supple.   Skin:     General: Skin is warm and dry.      Capillary Refill: Capillary refill takes less than 2 seconds.   Neurological:      General: No focal deficit present.      Mental Status: He is alert and oriented to person, place, and time. Mental status is at baseline.   Psychiatric:         Mood and Affect: " Mood normal.         Behavior: Behavior normal.         Thought Content: Thought content normal.         Judgment: Judgment normal.         Procedures    ED Course:         Lab Results (last 24 hours)       Procedure Component Value Units Date/Time    TISSUE EXAM, [facility] ([facility]) [[id number]] Collected: 05/19/25 1409    Specimen: Tissue from Abdomen, Left Updated: 05/20/25 0815             No radiology results from the last 24 hrs       MDM      Initial impression of presenting illness: Patient is a 35-year-old male without contributing health history.  Presents to the ER today for surgical site evaluation.  Patient reports that he underwent an lingual hernia repair by Dr. Chaparro yesterday.  Reports that he was moving around at home.  One of the lap sites are oozing a little bit of blood.  He denies any other symptoms.  Denies OTC medication or home remedy.  Denies alleviating or exacerbating factors.    DDX: includes but is not limited to: Hemorrhage, dehiscence, or other    Patient arrives stable with vitals interpreted by myself.     Pertinent features from physical exam: Steri-Strips to laparoscopic sites are intact.  There was a small amount of oozing from distal lap site small amount of pressure was held.  Steri-Strips were left in place.  Pressure dressing was applied.  I watched site for over 20 minutes with dime size amount of blood to new gauze.  Bleeding appears to be stopped.    Initial diagnostic plan: N/A    Results from initial plan were reviewed and interpreted by me revealing N/A    Diagnostic information from other sources: Chart review    Interventions / Re-evaluation: Vital signs stable throughout encounter    Results/clinical rationale were discussed with patient    Consultations/Discussion of results with other physicians: Spoke with Dr. Chaparro.  Okay to have patient follow-up in clinic.    Disposition plan:   Patient is hemodynamically stable nontoxic-appearing appropriate discharge.   Outpatient follow-up with Dr. Chaparro's office  -----        Final diagnoses:   Postprocedural hemorrhage of skin and subcutaneous tissue following other procedure          Bin Espinosa, APRN  05/20/25 1002

## 2025-05-20 NOTE — ED PROVIDER NOTES
EMERGENCY DEPARTMENT ENCOUNTER    Pt Name: Jose Lomax  MRN: [id number]  Pt :   1990  Room Number:    Date of encounter:  2025  PCP: Brittany Lamb APRN  ED Provider: Rodo Otra MD    Historian: Patient      HPI:  Chief Complaint: Difficulty urinating        Context: Jose Lomax is a 35 y.o. male who presents to the ED c/o urinate.  Patient underwent right inguinal hernia repair today.  He says since then he has been unable to urinate.  He says he has suprapubic abdominal discomfort      PAST MEDICAL HISTORY  Past Medical History:   Diagnosis Date    ADHD (attention deficit hyperactivity disorder)     Anxiety     Chest pain     Sharp and francesca.  Occurs about once a month and lasts a few seconds.  Pt states that he asked PCP about this in the past and was told this was most likely related to COPD.    COPD (chronic obstructive pulmonary disease)     Kidney stone     Sleep apnea     No CPAP used after tonsils and adenoids removed    Wears glasses          PAST SURGICAL HISTORY  Past Surgical History:   Procedure Laterality Date    ADENOIDECTOMY      TONSILLECTOMY      WISDOM TOOTH EXTRACTION Bilateral          FAMILY HISTORY  Family History   Problem Relation Age of Onset    No Known Problems Mother     No Known Problems Father     ADD / ADHD Sister         Add or adhd cant remeber which one    ADD / ADHD Brother         Add         SOCIAL HISTORY  Social History     Socioeconomic History    Marital status:    Tobacco Use    Smoking status: Former     Current packs/day: 0.00     Average packs/day: 1 pack/day for 16.0 years (16.0 ttl pk-yrs)     Types: Cigarettes     Start date:      Quit date:      Years since quittin.3     Passive exposure: Past    Smokeless tobacco: Never   Vaping Use    Vaping status: Every Day    Substances: Nicotine    Devices: Disposable   Substance and Sexual Activity    Alcohol use: Yes     Comment: when out to eat usually    Drug use: Yes      Types: Marijuana     Comment: daily use of    Sexual activity: Defer         ALLERGIES  Patient has no known allergies.        REVIEW OF SYSTEMS    All systems reviewed and negative except for those discussed in HPI.       PHYSICAL EXAM    I have reviewed the triage vital signs and nursing notes.    ED Triage Vitals   Temp Pulse Resp BP SpO2   -- -- -- -- --      Temp src Heart Rate Source Patient Position BP Location FiO2 (%)   -- -- -- -- --         General: no acute distress, well-appearing, non-toxic  Skin: normal color, warm and dry  Head: normocephalic, atraumatic  Eyes: Pupils equally round and reactive to light.  Nose: normal nasal mucosa, no visible deformity.  Mouth: moist mucous membranes. No posterior pharyngeal erythema, swelling or exudates.  Neck: supple.  Chest: no retractions, no visible deformity  Cardiovascular: Regular rate and rhythm.  Lungs: clear to auscultation bilaterally.  Abdomen: soft, suprapubic abdominal discomfort, non-distended. No rebound tenderness, no guarding.  No peritonitis.  Neuro:  alert and oriented x3, no focal neurological deficits.  Psych:  appropriate mood and behavior.        LAB RESULTS  No results found for this or any previous visit (from the past 24 hours).    If labs were ordered, I independently reviewed the results and considered them in treating the patient.  See medical decision making discussion section for my interpretation of lab results.        RADIOLOGY  No Radiology Exams Resulted Within Past 24 Hours      PROCEDURES    Procedures    No orders to display       MEDICATIONS GIVEN IN ER    Medications   Lidocaine HCl gel (XYLOCAINE) urethral/mucosal syringe ( Topical Given 5/19/25 9280)         MEDICAL DECISION MAKING, PROGRESS, and CONSULTS    All labs, if obtained, have been independently reviewed by me.  All radiology studies, if obtained, have been reviewed by me and the radiologist dictating the report.  All EKG's, if obtained, have been  independently viewed and interpreted by me/my attending physician.      Discussion below represents my analysis of pertinent findings related to patient's condition, differential diagnosis, treatment plan and final disposition.                         Differential diagnosis:    Differential diagnosis for this patient includes postoperative urinary retention, hepatitis, cholangitis, cholecystitis, pancreatitis, gastritis, enteritis, colitis, gastroenteritis, appendicitis, volvulus, obstruction, ischemia, torsion, cystitis, pyelonephritis, nephrolithiasis, uretolithiasis, other acute emergency.    Medical Decision Making Discussion:    Clinically, patient has postoperative urinary retention from anesthesia.  Bladder scan obtained which was greater than 500.  Pacheco catheter has been ordered.    About 500 cc of riki-colored urine expressed from Pacheco catheter.  On repeat exam patient resting comfortably.  Reports resolution of suprapubic abdominal discomfort.    Discharged with instructions to follow-up with urology and with surgery as scheduled.  Instructed to return to the emergency department before then for any concerning symptoms, worsening symptoms or new concerns.      Additional sources:    - External (non-ED) record review: Op note from May 19 documenting right inguinal indirect hernia.  Patient underwent robotic right inguinal herniorrhaphy with mesh.    Shared Decision Making:  After my consideration of clinical presentation and any laboratory/radiology studies obtained, I discussed the findings with the patient/patient representative who is in agreement with the treatment plan and the final disposition.   Risks and benefits of discharge and/or observation/admission were discussed.    Orders placed during this visit:  Orders Placed This Encounter   Procedures    Ambulatory Referral to Urology    Bladder scan    Insert Indwelling Urinary Catheter    Bladder scan         AS OF 03:06 EDT VITALS:    BP -  113/66  HR -    TEMP -    O2 SATS - 97%                  DIAGNOSIS  Final diagnoses:   Urinary retention         DISPOSITION  Discharge      Please note that portions of this document were completed with voice recognition software.        Rodo Orta MD  05/20/25 7196

## 2025-05-20 NOTE — DISCHARGE INSTRUCTIONS
Follow-up with urology and with general surgery as scheduled.  Return to the emergency department for any fever, concerning symptoms, worsening symptoms or new concerns.

## 2025-05-20 NOTE — TELEPHONE ENCOUNTER
"Pt is s/p right inguinal hernia repair performed yesterday by Dr. Chaparro.  Patient called the office c/o \"bleeding at the site.\"  Pt stated that when he woke-up this morning and his bandage was saturated with blood and he confirmed that he is having active bleeding at this time.  I asked pt to go to [facility] for evaluation, he stated that he will go asap.  "

## 2025-05-21 ENCOUNTER — TELEPHONE (OUTPATIENT)
Dept: SURGERY | Facility: CLINIC | Age: 35
End: 2025-05-21
Payer: COMMERCIAL

## 2025-05-21 LAB — REF LAB TEST METHOD: NORMAL

## 2025-05-21 NOTE — TELEPHONE ENCOUNTER
I talked with pt in regards to initial message regarding hernia repair surgery and no bm or passing gas.  I told him that I will talk with Dr. Chaparro this afternoon when he arrives at the office and call him back asap.

## 2025-05-21 NOTE — TELEPHONE ENCOUNTER
"I talked with pt, he stated \"I have actually been passing gas but I am still waiting for a call from urology about my catheter that I had to have put in after my surgery.\"  Per Dr. Chaparro, pt will be seen by Dr. Nayak 05/23/2025 for removal of catheter.   "

## 2025-05-21 NOTE — TELEPHONE ENCOUNTER
Mr. Lomax called and said he was advised by recovery to call and speak with Dr. Chaparro's CMA. He is not passing gas or has not had BM since his procedure. He had a right inguinal hernia repair that was performed on 05/19/2025. Please advise.

## 2025-05-21 NOTE — TELEPHONE ENCOUNTER
Hub staff attempted to follow warm transfer process and was unsuccessful     Caller: Jose Lomax    Relationship to patient: Self    Best call back number: [phone]    Patient is needing: PT IS REQUESTING TO SPEAK TO MAI. REPORTS HE RASH ON HIS STOMACH AND IS CONCERNED

## 2025-05-22 ENCOUNTER — APPOINTMENT (OUTPATIENT)
Dept: CT IMAGING | Facility: HOSPITAL | Age: 35
End: 2025-05-22
Payer: COMMERCIAL

## 2025-05-22 ENCOUNTER — HOSPITAL ENCOUNTER (EMERGENCY)
Facility: HOSPITAL | Age: 35
Discharge: HOME | End: 2025-05-22
Attending: EMERGENCY MEDICINE
Payer: COMMERCIAL

## 2025-05-22 ENCOUNTER — APPOINTMENT (OUTPATIENT)
Dept: GENERAL RADIOLOGY | Facility: HOSPITAL | Age: 35
End: 2025-05-22
Payer: COMMERCIAL

## 2025-05-22 ENCOUNTER — TELEPHONE (OUTPATIENT)
Dept: SURGERY | Facility: CLINIC | Age: 35
End: 2025-05-22
Payer: COMMERCIAL

## 2025-05-22 VITALS
DIASTOLIC BLOOD PRESSURE: 69 MMHG | HEART RATE: 52 BPM | HEIGHT: 71 IN | RESPIRATION RATE: 18 BRPM | OXYGEN SATURATION: 99 % | BODY MASS INDEX: 22.4 KG/M2 | TEMPERATURE: 97.9 F | SYSTOLIC BLOOD PRESSURE: 110 MMHG | WEIGHT: 160 LBS

## 2025-05-22 DIAGNOSIS — R07.9 CHEST PAIN, UNSPECIFIED TYPE: Primary | ICD-10-CM

## 2025-05-22 DIAGNOSIS — Z98.890 POSTOPERATIVE STATE: ICD-10-CM

## 2025-05-22 LAB
ALBUMIN SERPL-MCNC: 4.5 G/DL (ref 3.5–5.2)
ALBUMIN/GLOB SERPL: 1.5 G/DL
ALP SERPL-CCNC: 42 U/L (ref 39–117)
ALT SERPL W P-5'-P-CCNC: 13 U/L (ref 1–41)
ANION GAP SERPL CALCULATED.3IONS-SCNC: 10.8 MMOL/L (ref 5–15)
AST SERPL-CCNC: 20 U/L (ref 1–40)
BASOPHILS # BLD AUTO: 0.06 10*3/MM3 (ref 0–0.2)
BASOPHILS NFR BLD AUTO: 0.8 % (ref 0–1.5)
BILIRUB SERPL-MCNC: 0.6 MG/DL (ref 0–1.2)
BUN SERPL-MCNC: 13 MG/DL (ref 6–20)
BUN/CREAT SERPL: 10.9 (ref 7–25)
CALCIUM SPEC-SCNC: 9.4 MG/DL (ref 8.6–10.5)
CHLORIDE SERPL-SCNC: 102 MMOL/L (ref 98–107)
CO2 SERPL-SCNC: 27.2 MMOL/L (ref 22–29)
CREAT SERPL-MCNC: 1.19 MG/DL (ref 0.76–1.27)
DEPRECATED RDW RBC AUTO: 36.6 FL (ref 37–54)
EGFRCR SERPLBLD CKD-EPI 2021: 82.7 ML/MIN/1.73
EOSINOPHIL # BLD AUTO: 0.07 10*3/MM3 (ref 0–0.4)
EOSINOPHIL NFR BLD AUTO: 1 % (ref 0.3–6.2)
ERYTHROCYTE [DISTWIDTH] IN BLOOD BY AUTOMATED COUNT: 11.7 % (ref 12.3–15.4)
GLOBULIN UR ELPH-MCNC: 3 GM/DL
GLUCOSE SERPL-MCNC: 88 MG/DL (ref 65–99)
HCT VFR BLD AUTO: 42.1 % (ref 37.5–51)
HGB BLD-MCNC: 14.6 G/DL (ref 13–17.7)
HOLD SPECIMEN: NORMAL
HOLD SPECIMEN: NORMAL
IMM GRANULOCYTES # BLD AUTO: 0.02 10*3/MM3 (ref 0–0.05)
IMM GRANULOCYTES NFR BLD AUTO: 0.3 % (ref 0–0.5)
LYMPHOCYTES # BLD AUTO: 2.13 10*3/MM3 (ref 0.7–3.1)
LYMPHOCYTES NFR BLD AUTO: 29.3 % (ref 19.6–45.3)
MCH RBC QN AUTO: 29.8 PG (ref 26.6–33)
MCHC RBC AUTO-ENTMCNC: 34.7 G/DL (ref 31.5–35.7)
MCV RBC AUTO: 85.9 FL (ref 79–97)
MONOCYTES # BLD AUTO: 0.8 10*3/MM3 (ref 0.1–0.9)
MONOCYTES NFR BLD AUTO: 11 % (ref 5–12)
NEUTROPHILS NFR BLD AUTO: 4.18 10*3/MM3 (ref 1.7–7)
NEUTROPHILS NFR BLD AUTO: 57.6 % (ref 42.7–76)
NRBC BLD AUTO-RTO: 0 /100 WBC (ref 0–0.2)
PLATELET # BLD AUTO: 222 10*3/MM3 (ref 140–450)
PMV BLD AUTO: 10.4 FL (ref 6–12)
POTASSIUM SERPL-SCNC: 3.5 MMOL/L (ref 3.5–5.2)
PROT SERPL-MCNC: 7.5 G/DL (ref 6–8.5)
RBC # BLD AUTO: 4.9 10*6/MM3 (ref 4.14–5.8)
SODIUM SERPL-SCNC: 140 MMOL/L (ref 136–145)
TROPONIN T SERPL HS-MCNC: <6 NG/L
WBC NRBC COR # BLD AUTO: 7.26 10*3/MM3 (ref 3.4–10.8)
WHOLE BLOOD HOLD COAG: NORMAL
WHOLE BLOOD HOLD SPECIMEN: NORMAL

## 2025-05-22 PROCEDURE — 84484 ASSAY OF TROPONIN QUANT: CPT | Performed by: EMERGENCY MEDICINE

## 2025-05-22 PROCEDURE — 96374 THER/PROPH/DIAG INJ IV PUSH: CPT

## 2025-05-22 PROCEDURE — 25010000002 KETOROLAC TROMETHAMINE PER 15 MG: Performed by: PHYSICIAN ASSISTANT

## 2025-05-22 PROCEDURE — 25510000001 IOPAMIDOL 61 % SOLUTION: Performed by: EMERGENCY MEDICINE

## 2025-05-22 PROCEDURE — 25010000002 DIPHENHYDRAMINE PER 50 MG: Performed by: PHYSICIAN ASSISTANT

## 2025-05-22 PROCEDURE — 99285 EMERGENCY DEPT VISIT HI MDM: CPT | Performed by: EMERGENCY MEDICINE

## 2025-05-22 PROCEDURE — 25010000002 METOCLOPRAMIDE PER 10 MG: Performed by: PHYSICIAN ASSISTANT

## 2025-05-22 PROCEDURE — 74177 CT ABD & PELVIS W/CONTRAST: CPT

## 2025-05-22 PROCEDURE — 85025 COMPLETE CBC W/AUTO DIFF WBC: CPT | Performed by: EMERGENCY MEDICINE

## 2025-05-22 PROCEDURE — 71045 X-RAY EXAM CHEST 1 VIEW: CPT

## 2025-05-22 PROCEDURE — 25010000002 FAMOTIDINE 10 MG/ML SOLUTION: Performed by: PHYSICIAN ASSISTANT

## 2025-05-22 PROCEDURE — 93005 ELECTROCARDIOGRAM TRACING: CPT | Performed by: EMERGENCY MEDICINE

## 2025-05-22 PROCEDURE — 80053 COMPREHEN METABOLIC PANEL: CPT | Performed by: EMERGENCY MEDICINE

## 2025-05-22 PROCEDURE — 96375 TX/PRO/DX INJ NEW DRUG ADDON: CPT

## 2025-05-22 PROCEDURE — 71275 CT ANGIOGRAPHY CHEST: CPT

## 2025-05-22 RX ORDER — DIPHENHYDRAMINE HYDROCHLORIDE 50 MG/ML
25 INJECTION, SOLUTION INTRAMUSCULAR; INTRAVENOUS ONCE
Status: COMPLETED | OUTPATIENT
Start: 2025-05-22 | End: 2025-05-22

## 2025-05-22 RX ORDER — KETOROLAC TROMETHAMINE 30 MG/ML
15 INJECTION, SOLUTION INTRAMUSCULAR; INTRAVENOUS ONCE
Status: COMPLETED | OUTPATIENT
Start: 2025-05-22 | End: 2025-05-22

## 2025-05-22 RX ORDER — SODIUM CHLORIDE 0.9 % (FLUSH) 0.9 %
10 SYRINGE (ML) INJECTION AS NEEDED
Status: DISCONTINUED | OUTPATIENT
Start: 2025-05-22 | End: 2025-05-22 | Stop reason: HOSPADM

## 2025-05-22 RX ORDER — METOCLOPRAMIDE HYDROCHLORIDE 5 MG/ML
10 INJECTION INTRAMUSCULAR; INTRAVENOUS ONCE
Status: COMPLETED | OUTPATIENT
Start: 2025-05-22 | End: 2025-05-22

## 2025-05-22 RX ORDER — FAMOTIDINE 10 MG/ML
20 INJECTION, SOLUTION INTRAVENOUS ONCE
Status: COMPLETED | OUTPATIENT
Start: 2025-05-22 | End: 2025-05-22

## 2025-05-22 RX ORDER — IOPAMIDOL 612 MG/ML
100 INJECTION, SOLUTION INTRAVASCULAR
Status: COMPLETED | OUTPATIENT
Start: 2025-05-22 | End: 2025-05-22

## 2025-05-22 RX ADMIN — FAMOTIDINE 20 MG: 10 INJECTION, SOLUTION INTRAVENOUS at 16:01

## 2025-05-22 RX ADMIN — METOCLOPRAMIDE 10 MG: 5 INJECTION, SOLUTION INTRAMUSCULAR; INTRAVENOUS at 16:01

## 2025-05-22 RX ADMIN — DIPHENHYDRAMINE HYDROCHLORIDE 25 MG: 50 INJECTION, SOLUTION INTRAMUSCULAR; INTRAVENOUS at 16:01

## 2025-05-22 RX ADMIN — IOPAMIDOL 100 ML: 612 INJECTION, SOLUTION INTRAVENOUS at 14:49

## 2025-05-22 RX ADMIN — KETOROLAC TROMETHAMINE 15 MG: 30 INJECTION, SOLUTION INTRAMUSCULAR; INTRAVENOUS at 16:00

## 2025-05-22 NOTE — PROGRESS NOTES
"Patient: Jose Lomax    YOB: 1990    Date: 05/23/2025    Primary Care Provider: Brittany Lamb APRN    Chief Complaint   Patient presents with    Post-op Hernia       History of present illness:  I saw the patient in the office today as a followup from their recent [facility] with Dr Chaparro.   They are here today for removal of a Pacheco catheter.    Vital Signs:   Vitals:    05/23/25 1401   BP: 120/70   Pulse: 98   SpO2: 98%   Weight: 72.1 kg (159 lb)   Height: 180.3 cm (71\")       Physical Exam:    -No recurrent hernia, wounds look good no redness or drainage.     Assessment / Plan :    1. Postoperative visit      Pacheco catheter removed, no complication.  Follow-up to ER if patient unable to void in the next 12 hours.  Follow-up with Dr. Chaparro in 1 week.    Electronically signed by Sarbjit Nayak MD  05/23/25                "

## 2025-05-22 NOTE — TELEPHONE ENCOUNTER
"Pt is s/p right IH repair performed by Dr. Chaparro 05/19/2025.  I called pt regarding my-chart message with pictures of his abdomen attached.  Pt stated that he is at [facility] emergency department at this time because of chest pain.  Pt will address concerns with emergency department provider.  I also talked with pt in regards to his appt with Dr. Nayak tomorrow for catheter removal, he stated \"I will ask them here to take it out.\"  Pt will call to cancel appt for tomorrow if they do remove catheter.  "

## 2025-05-22 NOTE — DISCHARGE INSTRUCTIONS
Your labs are within normal limits here in the ER today.  CT showed small amount of air in the upper abdomen and mediastinum/chest which is normal after surgery.  Discussed your case with surgeon on-call who states this is a normal finding after surgery and should resolve with time.  Recommend follow-up with your surgeon as well as PCP for further outpatient evaluation as needed.  Take Tylenol and Motrin as needed per directions on the package.  Return to the ER for any new or worsening symptoms or acute concerns.

## 2025-05-22 NOTE — ED PROVIDER NOTES
Subjective:  Chief Complaint: Chest pain    History of Present Illness:  Patient is a 35-year-old male with history of ADHD, anxiety, COPD, kidney stones, sleep apnea presenting to the ER with complaints of chest pain that started shortly prior to arrival which he describes as sharp.  States that he is also having back pain.  Patient is status post hernia repair performed by Dr. Chaparro 2 days ago.  Patient states he has not had a bowel movement yet but is passing gas.  States that he has not been taking stool softeners.  Denies fevers.  States he is having some abdominal pain which he presumes is from surgery but nothing significant.  Also notes a rash to his torso that he noticed after surgery.      Nurses Notes reviewed and agree, including vitals, allergies, social history and prior medical history.     REVIEW OF SYSTEMS: All systems reviewed and not pertinent unless noted.  Review of Systems   Cardiovascular:  Positive for chest pain.   Gastrointestinal:  Positive for abdominal pain and constipation.   Musculoskeletal:  Positive for back pain.   All other systems reviewed and are negative.      Past Medical History:   Diagnosis Date    ADHD (attention deficit hyperactivity disorder)     Anxiety     Chest pain     Sharp and francesca.  Occurs about once a month and lasts a few seconds.  Pt states that he asked PCP about this in the past and was told this was most likely related to COPD.    COPD (chronic obstructive pulmonary disease)     Kidney stone     Sleep apnea     No CPAP used after tonsils and adenoids removed    Wears glasses        Allergies:    Patient has no known allergies.      Past Surgical History:   Procedure Laterality Date    ADENOIDECTOMY      INGUINAL HERNIA REPAIR Right 5/19/2025    Procedure: INGUINAL HERNIA REPAIR LAPAROSCOPIC WITH [employer] ROBOT;  Surgeon: Alphonso Chaparro MD;  Location: [facility];  Service: Robotics - DaVinci;  Laterality: Right;    TONSILLECTOMY      WISDOM TOOTH EXTRACTION  "Bilateral          Social History     Socioeconomic History    Marital status:    Tobacco Use    Smoking status: Former     Current packs/day: 0.00     Average packs/day: 1 pack/day for 16.0 years (16.0 ttl pk-yrs)     Types: Cigarettes     Start date:      Quit date:      Years since quittin.3     Passive exposure: Past    Smokeless tobacco: Never   Vaping Use    Vaping status: Every Day    Substances: Nicotine    Devices: Disposable   Substance and Sexual Activity    Alcohol use: Yes     Comment: when out to eat usually    Drug use: Yes     Types: Marijuana     Comment: daily use of    Sexual activity: Defer         Family History   Problem Relation Age of Onset    No Known Problems Mother     No Known Problems Father     ADD / ADHD Sister         Add or adhd cant remeber which one    ADD / ADHD Brother         Add       Objective  Physical Exam:  /85 (BP Location: Left arm, Patient Position: Sitting)   Pulse 77   Temp 98.7 °F (37.1 °C) (Oral)   Resp 20   Ht 180.3 cm (71\")   Wt 72.6 kg (160 lb)   SpO2 100%   BMI 22.32 kg/m²      Physical Exam  Vitals and nursing note reviewed.   Constitutional:       General: He is not in acute distress.     Appearance: He is not toxic-appearing.   HENT:      Head: Normocephalic and atraumatic.   Eyes:      Extraocular Movements: Extraocular movements intact.   Cardiovascular:      Rate and Rhythm: Normal rate.      Heart sounds: Normal heart sounds.   Pulmonary:      Effort: Pulmonary effort is normal.      Breath sounds: Normal breath sounds.   Abdominal:      Palpations: Abdomen is soft.      Tenderness: There is no abdominal tenderness.      Comments: Diffuse maculopapular rash noted to torso concerning for allergic reaction, possibly to cleaning solution prior to surgery   Musculoskeletal:         General: Normal range of motion.      Cervical back: Normal range of motion and neck supple.      Right lower leg: No edema.      Left lower leg: No " edema.   Skin:     General: Skin is warm and dry.   Neurological:      General: No focal deficit present.      Mental Status: He is alert and oriented to person, place, and time.   Psychiatric:         Mood and Affect: Mood normal.         Behavior: Behavior normal.         Procedures    ED Course:    ED Course as of 05/22/25 1720   Thu May 22, 2025   1415 EKG: I reviewed and independently interpreted the EKG as:  Sinus rhythm at 62, normal axis, normal intervals, no ST elevation, no T wave inversions [CS]   1705 Spoke with Dr. Morrow, General Surgery, who states that these findings would not be abnormal after surgery.  States that it should resolve with time. [AP]      ED Course User Index  [AP] Akiko Cox PA-C  [CS] Chris Bhatti MD       Lab Results (last 24 hours)       Procedure Component Value Units Date/Time    CBC & Differential [[id number]]  (Abnormal) Collected: 05/22/25 1227    Specimen: Blood Updated: 05/22/25 1235    Narrative:      The following orders were created for panel order CBC & Differential.  Procedure                               Abnormality         Status                     ---------                               -----------         ------                     CBC Auto Differential[[id number]]        Abnormal            Final result                 Please view results for these tests on the individual orders.    Comprehensive Metabolic Panel [[id number]] Collected: 05/22/25 1227    Specimen: Blood Updated: 05/22/25 1253     Glucose 88 mg/dL      BUN 13 mg/dL      Creatinine 1.19 mg/dL      Sodium 140 mmol/L      Potassium 3.5 mmol/L      Chloride 102 mmol/L      CO2 27.2 mmol/L      Calcium 9.4 mg/dL      Total Protein 7.5 g/dL      Albumin 4.5 g/dL      ALT (SGPT) 13 U/L      AST (SGOT) 20 U/L      Alkaline Phosphatase 42 U/L      Total Bilirubin 0.6 mg/dL      Globulin 3.0 gm/dL      A/G Ratio 1.5 g/dL      BUN/Creatinine Ratio 10.9     Anion Gap 10.8 mmol/L      eGFR 82.7  mL/min/1.73     Narrative:      GFR Categories in Chronic Kidney Disease (CKD)              GFR Category          GFR (mL/min/1.73)    Interpretation  G1                    90 or greater        Normal or high (1)  G2                    60-89                Mild decrease (1)  G3a                   45-59                Mild to moderate decrease  G3b                   30-44                Moderate to severe decrease  G4                    15-29                Severe decrease  G5                    14 or less           Kidney failure    (1)In the absence of evidence of kidney disease, neither GFR category G1 or G2 fulfill the criteria for CKD.    eGFR calculation 2021 CKD-EPI creatinine equation, which does not include race as a factor    High Sensitivity Troponin T [[id number]]  (Normal) Collected: 05/22/25 1227    Specimen: Blood Updated: 05/22/25 1253     HS Troponin T <6 ng/L     Narrative:      High Sensitive Troponin T Reference Range:  <14.0 ng/L- Negative Female for AMI  <22.0 ng/L- Negative Male for AMI  >=14 - Abnormal Female indicating possible myocardial injury.  >=22 - Abnormal Male indicating possible myocardial injury.   Clinicians would have to utilize clinical acumen, EKG, Troponin, and serial changes to determine if it is an Acute Myocardial Infarction or myocardial injury due to an underlying chronic condition.         CBC Auto Differential [[id number]]  (Abnormal) Collected: 05/22/25 1227    Specimen: Blood Updated: 05/22/25 1235     WBC 7.26 10*3/mm3      RBC 4.90 10*6/mm3      Hemoglobin 14.6 g/dL      Hematocrit 42.1 %      MCV 85.9 fL      MCH 29.8 pg      MCHC 34.7 g/dL      RDW 11.7 %      RDW-SD 36.6 fl      MPV 10.4 fL      Platelets 222 10*3/mm3      Neutrophil % 57.6 %      Lymphocyte % 29.3 %      Monocyte % 11.0 %      Eosinophil % 1.0 %      Basophil % 0.8 %      Immature Grans % 0.3 %      Neutrophils, Absolute 4.18 10*3/mm3      Lymphocytes, Absolute 2.13 10*3/mm3      Monocytes,  Absolute 0.80 10*3/mm3      Eosinophils, Absolute 0.07 10*3/mm3      Basophils, Absolute 0.06 10*3/mm3      Immature Grans, Absolute 0.02 10*3/mm3      nRBC 0.0 /100 WBC              CT Angiogram Chest Pulmonary Embolism  Result Date: 5/22/2025  PROCEDURE: CT ANGIOGRAM CHEST PULMONARY EMBOLISM-  HISTORY: recent surgery, comes in with chest pain that is sharp, back pain, abdominal pain, constipation  COMPARISON: 9/1/2024.  TECHNIQUE: Multiple axial CT images were obtained from the thoracic inlet through the upper abdomen following the administration of Isovue 300 per the CT PE protocol. Coronal and oblique 3D MIP images were reconstructed from the original axial data set.  FINDINGS: There are no filling defects within the pulmonary arteries to suggest an embolus. The thoracic aorta is normal in caliber. Cannot evaluate for aortic dissection secondary to timing of contrast bolus. The heart is normal in size. There are no pleural or pericardial effusions. There is no adenopathy.  Lung windows reveal no focal opacities or suspicious pulmonary nodules. There are a few bubbles of air within the anterior mediastinum and upper abdomen. Bone windows reveal no acute osseous abnormalities.      Impression: No evidence of pulmonary embolus.  Few bubbles of air within the anterior mediastinum and upper abdomen likely related to recent hernia repair. Please see abdominal pelvic CT report from same day.     This study was performed with techniques to keep radiation doses as low as reasonably achievable (ALARA). Individualized dose reduction techniques using automated exposure control or adjustment of mA and/or kV according to the patient size were employed.    Images were reviewed, interpreted, and dictated by Dr. Xochitl Cook MD Transcribed by Ricardo Nye PA-C.  This report was signed and finalized on 5/22/2025 4:16 PM by Xochitl Cook MD.      CT Abdomen Pelvis With Contrast  Result Date: 5/22/2025  PROCEDURE: CT ABDOMEN PELVIS  W CONTRAST-  HISTORY:  recent surgery, comes in with chest pain that is sharp, back pain, abdominal pain, constipation  COMPARISON:  None .  TECHNIQUE: Multiple axial CT images were obtained from the lung bases through the pubic symphysis following the administration of Isovue 300 contrast.  FINDINGS:  ABDOMEN: The liver is normal. The gallbladder is present, no CT visualized stones. The spleen is unremarkable. No adrenal mass is present.  The pancreas is normal. There is a small nonobstructing stone within the right kidney. No hydronephrosis. The aorta is normal in caliber. There is no free fluid or adenopathy. The abdominal portions of the GI tract are unremarkable with no evidence of obstruction. There are several small bubbles of free air within the upper abdomen surrounding the liver. There are a few additional bubbles of air within the right lower quadrant. No rim-enhancing fluid collection to suggest abscess.  PELVIS: The appendix is not identified.  The urinary bladder is decompressed by Pacheco catheter. There is no significant free fluid or adenopathy. There are multiple bubbles of air seen within the right groin. There is subcutaneous air at the site of hernia repair and extending into the right scrotum. No rim-enhancing fluid collection to suggest abscess. There is mild infiltration of the fat in the right lower quadrant at the surgery site likely post surgical.       Impression: Small bubbles of air within the upper abdomen, right lower quadrant and right groin likely related to recent hernia repair 3 days prior.  No rim-enhancing fluid collections to suggest abscess.   This study was performed with techniques to keep radiation doses as low as reasonably achievable (ALARA). Individualized dose reduction techniques using automated exposure control or adjustment of mA and/or kV according to the patient size were employed.      Images were reviewed, interpreted, and dictated by Dr. Xochitl Cook MD  Transcribed by Ricardo Nye PA-C.  This report was signed and finalized on 5/22/2025 4:13 PM by Xochitl Cook MD.      XR Chest 1 View  Result Date: 5/22/2025  PROCEDURE: XR CHEST 1 VW-  HISTORY: Chest Pain Triage Protocol, started having left-sided chest pain 20 minutes ago, had right-sided inguinal hernia repair with da Suellen robot 4 days ago.  COMPARISON: September 1, 2024..  FINDINGS: The heart is normal in size. The lungs are clear. The mediastinum is unremarkable. There is no pneumothorax.  There are no acute osseous abnormalities.      Impression: No acute cardiopulmonary process.      This report was signed and finalized on 5/22/2025 1:17 PM by Xochitl Cook MD.           MDM  Patient evaluated in the ER for chest pain, back pain, abdominal pain, rash, constipation after surgery.  Patient hemodynamically stable, afebrile, nontoxic-appearing on exam.  Differential diagnosis includes but is not limited to allergic reaction to chlorhexidine or presurgery cleaning solution, PE, pleuritis, viral illness, pneumonia, constipation, GERD, esophagitis, among others.  Initial plan includes CBC, CMP, troponins, EKG, CT PE protocol and CT abdomen pelvis.  Initial treatment with Reglan, Benadryl, Pepcid, Toradol.    Labs are unremarkable with normal troponin, kidney function, liver function, white blood cell count, electrolytes.  CT as reported above by radiology reveals small bubbles of air within the upper abdomen in the mediastinum that are likely related to recent hernia repair.  No abscess or PE, no further abnormalities noted.  Discussed CT findings with surgery, Dr. Morrow, who states that this can be a normal finding after surgery and does not recommend any further workup.  States that this should resolve over time.  Patient is feeling better on re-evaluation with no chest pain.  States it has resolved.  Did discuss with patient that a second troponin had been ordered but had not been collected.  Discussed that  this would be to rule out cardiac injury or ischemia.  Offered to obtain second troponin but did discuss that based on workup and presentation feel this is more likely related to recent surgery and given that chest pain has resolved and low risk for ischemia with HEART score of 1, suspect second troponin would be normal.  Patient does not wish to wait on second troponin.  He would prefer to be discharged.  Recommended close follow-up with general surgery and PCP for further outpatient evaluation.  Precautions were given for return to the ER for any new or worsening symptoms.    Final diagnoses:   Chest pain, unspecified type   Postoperative state          Akiko Cox, PA-C  05/22/25 5756

## 2025-05-23 ENCOUNTER — OFFICE VISIT (OUTPATIENT)
Dept: SURGERY | Facility: CLINIC | Age: 35
End: 2025-05-23
Payer: COMMERCIAL

## 2025-05-23 ENCOUNTER — HOSPITAL ENCOUNTER (EMERGENCY)
Facility: HOSPITAL | Age: 35
Discharge: HOME | End: 2025-05-23
Attending: STUDENT IN AN ORGANIZED HEALTH CARE EDUCATION/TRAINING PROGRAM
Payer: COMMERCIAL

## 2025-05-23 VITALS
WEIGHT: 159 LBS | HEIGHT: 71 IN | HEART RATE: 98 BPM | SYSTOLIC BLOOD PRESSURE: 120 MMHG | OXYGEN SATURATION: 98 % | DIASTOLIC BLOOD PRESSURE: 70 MMHG | BODY MASS INDEX: 22.26 KG/M2

## 2025-05-23 VITALS
RESPIRATION RATE: 17 BRPM | TEMPERATURE: 98.1 F | DIASTOLIC BLOOD PRESSURE: 77 MMHG | OXYGEN SATURATION: 96 % | HEART RATE: 107 BPM | SYSTOLIC BLOOD PRESSURE: 112 MMHG | BODY MASS INDEX: 22.29 KG/M2 | HEIGHT: 71 IN | WEIGHT: 159.2 LBS

## 2025-05-23 DIAGNOSIS — R31.29: ICD-10-CM

## 2025-05-23 DIAGNOSIS — Z09 POSTOP CHECK: Primary | ICD-10-CM

## 2025-05-23 DIAGNOSIS — Z48.89 POSTOPERATIVE VISIT: Primary | ICD-10-CM

## 2025-05-23 PROCEDURE — 99024 POSTOP FOLLOW-UP VISIT: CPT | Performed by: SURGERY

## 2025-05-23 PROCEDURE — 99282 EMERGENCY DEPT VISIT SF MDM: CPT | Performed by: STUDENT IN AN ORGANIZED HEALTH CARE EDUCATION/TRAINING PROGRAM

## 2025-05-23 NOTE — DISCHARGE INSTRUCTIONS
Follow-up with surgeon tomorrow in clinic for removal of Pacheco catheter.  Recommend continuing previously prescribed pain regimen as needed for intermittent pain.  Return to emergency department if pain significantly worsen and does not resolve with rest/sitting.

## 2025-05-23 NOTE — ED PROVIDER NOTES
[facility] EMERGENCY DEPARTMENT  Emergency Department Encounter  Emergency Medicine Physician Note       Pt Name: Jose Lomax  MRN: [id number]  Pt :   1990  Room Number:    Date of encounter:  2025  PCP: Brittany Lamb APRN  ED Provider: Abhi Zurita MD    Historian: Patient      HPI:  Chief Complaint: Postop check        Context: Jose Lomax is a 35 y.o. male who presents to the ED c/o postop check.  Patient had right inguinal hernia repair on  and has had 3 prior ER visits prior to tonight.  States that for the past few days he has been having intermittent pain in his right testicle whenever he stands for prolonged periods of time.  States that he is in no pain right now while seated on stretcher.  Also reports intermittent blood in his Pacheco catheter bag, states it is small and streaky but no gross blood.  Declines any fevers.  Has appointment at 2 PM today for evaluation and Pacheco catheter removal.      PAST MEDICAL HISTORY  Past Medical History:   Diagnosis Date    ADHD (attention deficit hyperactivity disorder)     Anxiety     Chest pain     Sharp and francesca.  Occurs about once a month and lasts a few seconds.  Pt states that he asked PCP about this in the past and was told this was most likely related to COPD.    COPD (chronic obstructive pulmonary disease)     Kidney stone     Sleep apnea     No CPAP used after tonsils and adenoids removed    Wears glasses          PAST SURGICAL HISTORY  Past Surgical History:   Procedure Laterality Date    ADENOIDECTOMY      INGUINAL HERNIA REPAIR Right 2025    Procedure: INGUINAL HERNIA REPAIR LAPAROSCOPIC WITH DAVINCI ROBOT;  Surgeon: Alphonso Chaparro MD;  Location: [facility];  Service: Robotics - DaVinci;  Laterality: Right;    TONSILLECTOMY      WISDOM TOOTH EXTRACTION Bilateral          FAMILY HISTORY  Family History   Problem Relation Age of Onset    No Known Problems Mother     No Known Problems Father      ADD / ADHD Sister         Add or adhd cant remeber which one    ADD / ADHD Brother         Add         SOCIAL HISTORY  Social History     Socioeconomic History    Marital status:    Tobacco Use    Smoking status: Former     Current packs/day: 0.00     Average packs/day: 1 pack/day for 16.0 years (16.0 ttl pk-yrs)     Types: Cigarettes     Start date:      Quit date:      Years since quittin.3     Passive exposure: Past    Smokeless tobacco: Never   Vaping Use    Vaping status: Every Day    Substances: Nicotine    Devices: Disposable   Substance and Sexual Activity    Alcohol use: Yes     Comment: when out to eat usually    Drug use: Yes     Types: Marijuana     Comment: daily use of    Sexual activity: Defer         ALLERGIES  Patient has no known allergies.        REVIEW OF SYSTEMS  Review of Systems     All systems reviewed and negative except for those discussed in HPI.       PHYSICAL EXAM    I have reviewed the triage vital signs and nursing notes.    ED Triage Vitals [25 0036]   Temp Heart Rate Resp BP SpO2   98.1 °F (36.7 °C) 107 17 112/77 96 %      Temp src Heart Rate Source Patient Position BP Location FiO2 (%)   Oral Monitor Sitting Left arm --       Physical Exam    General:  Awake, alert, no acute distress  HEENT: Atraumatic, normocephalic, EOMI, PERRLA, mucous membranes moist  NECK:  Supple, atraumatic  Cardiovascular:  Regular rate.  all extremities well perfused  Respiratory:  Regular rate, equal chest rise. No resp distress  Abdominal:  Soft, nondistended  Genitourinary: Pahceco catheter in place.  No gross hematuria in Pacheco catheter tubing or from urethral meatus.  No significant testicular swelling or erythema.  Extremity:  No visible bony abnormalities in all 4 extremities.  Full range of motion of all extremities.  Skin:  Warm and dry.   Neuro:  AAOx3, GCS 15.  moving all extremities  Psych:   Normal mood and affect        LAB RESULTS  Recent Results (from the past 24  hours)   Comprehensive Metabolic Panel    Collection Time: 05/22/25 12:27 PM    Specimen: Blood   Result Value Ref Range    Glucose 88 65 - 99 mg/dL    BUN 13 6 - 20 mg/dL    Creatinine 1.19 0.76 - 1.27 mg/dL    Sodium 140 136 - 145 mmol/L    Potassium 3.5 3.5 - 5.2 mmol/L    Chloride 102 98 - 107 mmol/L    CO2 27.2 22.0 - 29.0 mmol/L    Calcium 9.4 8.6 - 10.5 mg/dL    Total Protein 7.5 6.0 - 8.5 g/dL    Albumin 4.5 3.5 - 5.2 g/dL    ALT (SGPT) 13 1 - 41 U/L    AST (SGOT) 20 1 - 40 U/L    Alkaline Phosphatase 42 39 - 117 U/L    Total Bilirubin 0.6 0.0 - 1.2 mg/dL    Globulin 3.0 gm/dL    A/G Ratio 1.5 g/dL    BUN/Creatinine Ratio 10.9 7.0 - 25.0    Anion Gap 10.8 5.0 - 15.0 mmol/L    eGFR 82.7 >60.0 mL/min/1.73   High Sensitivity Troponin T    Collection Time: 05/22/25 12:27 PM    Specimen: Blood   Result Value Ref Range    HS Troponin T <6 <22 ng/L   Green Top (Gel)    Collection Time: 05/22/25 12:27 PM   Result Value Ref Range    Extra Tube Hold for add-ons.    Lavender Top    Collection Time: 05/22/25 12:27 PM   Result Value Ref Range    Extra Tube hold for add-on    Gold Top - SST    Collection Time: 05/22/25 12:27 PM   Result Value Ref Range    Extra Tube Hold for add-ons.    Light Blue Top    Collection Time: 05/22/25 12:27 PM   Result Value Ref Range    Extra Tube Hold for add-ons.    CBC Auto Differential    Collection Time: 05/22/25 12:27 PM    Specimen: Blood   Result Value Ref Range    WBC 7.26 3.40 - 10.80 10*3/mm3    RBC 4.90 4.14 - 5.80 10*6/mm3    Hemoglobin 14.6 13.0 - 17.7 g/dL    Hematocrit 42.1 37.5 - 51.0 %    MCV 85.9 79.0 - 97.0 fL    MCH 29.8 26.6 - 33.0 pg    MCHC 34.7 31.5 - 35.7 g/dL    RDW 11.7 (L) 12.3 - 15.4 %    RDW-SD 36.6 (L) 37.0 - 54.0 fl    MPV 10.4 6.0 - 12.0 fL    Platelets 222 140 - 450 10*3/mm3    Neutrophil % 57.6 42.7 - 76.0 %    Lymphocyte % 29.3 19.6 - 45.3 %    Monocyte % 11.0 5.0 - 12.0 %    Eosinophil % 1.0 0.3 - 6.2 %    Basophil % 0.8 0.0 - 1.5 %    Immature Grans %  "0.3 0.0 - 0.5 %    Neutrophils, Absolute 4.18 1.70 - 7.00 10*3/mm3    Lymphocytes, Absolute 2.13 0.70 - 3.10 10*3/mm3    Monocytes, Absolute 0.80 0.10 - 0.90 10*3/mm3    Eosinophils, Absolute 0.07 0.00 - 0.40 10*3/mm3    Basophils, Absolute 0.06 0.00 - 0.20 10*3/mm3    Immature Grans, Absolute 0.02 0.00 - 0.05 10*3/mm3    nRBC 0.0 0.0 - 0.2 /100 WBC             PROCEDURES    Procedures    No orders to display       MEDICATIONS GIVEN IN ER    Medications - No data to display      MEDICAL DECISION MAKING, PROGRESS, and CONSULTS    All labs, if obtained, have been independently reviewed by me.  All radiology studies, if obtained, have been evaluated by me and the radiologist dictating the report.  All EKG's, if obtained, have been independently viewed and interpreted by me.      Discussion below represents my analysis of pertinent findings related to patient's condition, differential diagnosis, treatment plan and final disposition.    Jose Lomax is a 35 y.o. male who presents to the ED c/o postoperative check.  Hemodynamically stable nontoxic in appearance upon arrival.  Patient had reported mild blood in Pacheco catheter tubing, no obvious gross hematuria on exam.  Offered patient urinalysis to evaluate for infection but patient declined.  Blood in catheter tubing likely from catheter insertion or catheter causing some irritation rather than infectious cause.  Patient also reports intermittent pain in the right groin adjacent to surgical site but pain resolves whenever he is seated or laying down/resting.  Denies any current pain.  Testicular torsion felt to be much less likely, likely just postoperative pain from standing/too much activity.  Patient declined any further workup in emergency department and would prefer to follow-up with his surgeon in clinic today at 2 PM.  Patient thinks that he \"just got too anxious\" and that is why he came to the emergency department.  Advised on return precautions.  Patient " agreeable with plan and desires to be discharged without any further workup.            HEART Score: 1                      Orders placed during this visit:  No orders of the defined types were placed in this encounter.        ED Course:                  Shared Decision Making:  After my consideration of clinical presentation and any laboratory/radiology studies obtained, I discussed the findings with the patient/patient representative who is in agreement with the treatment plan and the final disposition.   Risks and benefits of discharge and/or observation/admission were discussed.      AS OF 01:02 EDT VITALS:    BP - 112/77  HR - 107  TEMP - 98.1 °F (36.7 °C) (Oral)  O2 SATS - 96%                  DIAGNOSIS  Final diagnoses:   Postop check   Intermittent microscopic hematuria         DISPOSITION  Discharge      Please note that portions of this document were completed with voice recognition software.        Abhi Zurita MD  05/23/25 0123

## 2025-05-24 ENCOUNTER — HOSPITAL ENCOUNTER (EMERGENCY)
Facility: HOSPITAL | Age: 35
Discharge: HOME | End: 2025-05-24
Attending: EMERGENCY MEDICINE
Payer: COMMERCIAL

## 2025-05-24 ENCOUNTER — APPOINTMENT (OUTPATIENT)
Dept: CT IMAGING | Facility: HOSPITAL | Age: 35
End: 2025-05-24
Payer: COMMERCIAL

## 2025-05-24 ENCOUNTER — APPOINTMENT (OUTPATIENT)
Dept: ULTRASOUND IMAGING | Facility: HOSPITAL | Age: 35
End: 2025-05-24
Payer: COMMERCIAL

## 2025-05-24 VITALS
TEMPERATURE: 97.9 F | BODY MASS INDEX: 21.98 KG/M2 | HEART RATE: 93 BPM | WEIGHT: 156.97 LBS | DIASTOLIC BLOOD PRESSURE: 79 MMHG | HEIGHT: 71 IN | SYSTOLIC BLOOD PRESSURE: 108 MMHG | OXYGEN SATURATION: 96 % | RESPIRATION RATE: 16 BRPM

## 2025-05-24 DIAGNOSIS — K59.00 CONSTIPATION, UNSPECIFIED CONSTIPATION TYPE: Primary | ICD-10-CM

## 2025-05-24 DIAGNOSIS — N50.89 SWELLING OF RIGHT TESTICLE: ICD-10-CM

## 2025-05-24 LAB
ALBUMIN SERPL-MCNC: 4.7 G/DL (ref 3.5–5.2)
ALBUMIN/GLOB SERPL: 1.7 G/DL
ALP SERPL-CCNC: 46 U/L (ref 39–117)
ALT SERPL W P-5'-P-CCNC: 13 U/L (ref 1–41)
ANION GAP SERPL CALCULATED.3IONS-SCNC: 9.6 MMOL/L (ref 5–15)
AST SERPL-CCNC: 19 U/L (ref 1–40)
BASOPHILS # BLD AUTO: 0.05 10*3/MM3 (ref 0–0.2)
BASOPHILS NFR BLD AUTO: 0.7 % (ref 0–1.5)
BILIRUB SERPL-MCNC: 0.8 MG/DL (ref 0–1.2)
BILIRUB UR QL STRIP: NEGATIVE
BUN SERPL-MCNC: 12 MG/DL (ref 6–20)
BUN/CREAT SERPL: 11.1 (ref 7–25)
CALCIUM SPEC-SCNC: 10 MG/DL (ref 8.6–10.5)
CHLORIDE SERPL-SCNC: 102 MMOL/L (ref 98–107)
CLARITY UR: CLEAR
CO2 SERPL-SCNC: 27.4 MMOL/L (ref 22–29)
COLOR UR: YELLOW
CREAT SERPL-MCNC: 1.08 MG/DL (ref 0.76–1.27)
DEPRECATED RDW RBC AUTO: 36.4 FL (ref 37–54)
EGFRCR SERPLBLD CKD-EPI 2021: 92.9 ML/MIN/1.73
EOSINOPHIL # BLD AUTO: 0.09 10*3/MM3 (ref 0–0.4)
EOSINOPHIL NFR BLD AUTO: 1.3 % (ref 0.3–6.2)
ERYTHROCYTE [DISTWIDTH] IN BLOOD BY AUTOMATED COUNT: 11.6 % (ref 12.3–15.4)
GLOBULIN UR ELPH-MCNC: 2.8 GM/DL
GLUCOSE SERPL-MCNC: 99 MG/DL (ref 65–99)
GLUCOSE UR STRIP-MCNC: NEGATIVE MG/DL
HCT VFR BLD AUTO: 42.5 % (ref 37.5–51)
HGB BLD-MCNC: 14.6 G/DL (ref 13–17.7)
HGB UR QL STRIP.AUTO: NEGATIVE
IMM GRANULOCYTES # BLD AUTO: 0.02 10*3/MM3 (ref 0–0.05)
IMM GRANULOCYTES NFR BLD AUTO: 0.3 % (ref 0–0.5)
KETONES UR QL STRIP: NEGATIVE
LEUKOCYTE ESTERASE UR QL STRIP.AUTO: NEGATIVE
LIPASE SERPL-CCNC: 34 U/L (ref 13–60)
LYMPHOCYTES # BLD AUTO: 1.25 10*3/MM3 (ref 0.7–3.1)
LYMPHOCYTES NFR BLD AUTO: 18.3 % (ref 19.6–45.3)
MCH RBC QN AUTO: 29.3 PG (ref 26.6–33)
MCHC RBC AUTO-ENTMCNC: 34.4 G/DL (ref 31.5–35.7)
MCV RBC AUTO: 85.2 FL (ref 79–97)
MONOCYTES # BLD AUTO: 0.53 10*3/MM3 (ref 0.1–0.9)
MONOCYTES NFR BLD AUTO: 7.8 % (ref 5–12)
NEUTROPHILS NFR BLD AUTO: 4.89 10*3/MM3 (ref 1.7–7)
NEUTROPHILS NFR BLD AUTO: 71.6 % (ref 42.7–76)
NITRITE UR QL STRIP: NEGATIVE
NRBC BLD AUTO-RTO: 0 /100 WBC (ref 0–0.2)
PH UR STRIP.AUTO: 7.5 [PH] (ref 5–8)
PLATELET # BLD AUTO: 223 10*3/MM3 (ref 140–450)
PMV BLD AUTO: 10.1 FL (ref 6–12)
POTASSIUM SERPL-SCNC: 3.9 MMOL/L (ref 3.5–5.2)
PROT SERPL-MCNC: 7.5 G/DL (ref 6–8.5)
PROT UR QL STRIP: NEGATIVE
RBC # BLD AUTO: 4.99 10*6/MM3 (ref 4.14–5.8)
SODIUM SERPL-SCNC: 139 MMOL/L (ref 136–145)
SP GR UR STRIP: 1.01 (ref 1–1.03)
UROBILINOGEN UR QL STRIP: NORMAL
WBC NRBC COR # BLD AUTO: 6.83 10*3/MM3 (ref 3.4–10.8)

## 2025-05-24 PROCEDURE — 76870 US EXAM SCROTUM: CPT

## 2025-05-24 PROCEDURE — 87491 CHLMYD TRACH DNA AMP PROBE: CPT

## 2025-05-24 PROCEDURE — 83690 ASSAY OF LIPASE: CPT

## 2025-05-24 PROCEDURE — 36415 COLL VENOUS BLD VENIPUNCTURE: CPT

## 2025-05-24 PROCEDURE — 87591 N.GONORRHOEAE DNA AMP PROB: CPT

## 2025-05-24 PROCEDURE — 74176 CT ABD & PELVIS W/O CONTRAST: CPT

## 2025-05-24 PROCEDURE — 80053 COMPREHEN METABOLIC PANEL: CPT

## 2025-05-24 PROCEDURE — 99284 EMERGENCY DEPT VISIT MOD MDM: CPT | Performed by: EMERGENCY MEDICINE

## 2025-05-24 PROCEDURE — 81003 URINALYSIS AUTO W/O SCOPE: CPT

## 2025-05-24 PROCEDURE — 85025 COMPLETE CBC W/AUTO DIFF WBC: CPT

## 2025-05-24 RX ORDER — POLYETHYLENE GLYCOL 3350 17 G/17G
17 POWDER, FOR SOLUTION ORAL DAILY
Qty: 5 PACKET | Refills: 0 | Status: SHIPPED | OUTPATIENT
Start: 2025-05-24 | End: 2025-05-29

## 2025-05-24 NOTE — DISCHARGE INSTRUCTIONS
Patient is here for the following complaints:  1. Transitional care  .  HISTORY:  Medications, allergies and nursing notes reviewed.  She was admitted to hospital 8/30 and discharged 9/4  She had coughing  They had done CT and venous dopplers which did show PE or DVT  She said she did not have fevers  Discharge summary is not available.  She has cardiomyopathy perhaps adriamycin induced  Her EF was stable at 24%   Troponin was elevated  She is doing ok at home occasional cough  Has appt Dr Reich one week from today   She now has a defibrillator in place      EXAMINATION:  BLOOD PRESSURE:     As above  nad  LUNGS:  clear  CORONARY:    rr possible rub occasional extrasystlee  Legs: at most trace edema    IMPRESSION:  1. cardiomyopathy  2. Possible mild pericarditis      PLAN:  1. Continue meds  2. Reviewed lab echo etc  3. Keep cardiology appt       Your CT scan today showed findings consistent with constipation.  We have prescribed you MiraLAX medication which we would like for you to try over the next 24 to 48 hours first to alleviate your constipation.  If MiraLAX fails to produce a bowel movement you can then use your GoLytely medication as prescribed.  We highly encourage you to follow-up on Monday with your general surgeon that performed your surgery regarding your constipation as well as your right testicle swelling and follow-up with them for further evaluation.  Return to the ER however for any acute changes or worsening of your condition.

## 2025-05-24 NOTE — ED PROVIDER NOTES
EMERGENCY DEPARTMENT ENCOUNTER    Pt Name: Jose Lomax  MRN: [id number]  Pt :   1990  Room Number:    Date of encounter:  2025  PCP: Brittany Lamb APRN  ED Provider: Adriel Krause PA-C    Historian: Patient, nursing notes      HPI:  Chief Complaint: Constipation, testicular swelling        Context: Jose Lomax is a 35 y.o. male who presents to the ED c/o constipation since his right inguinal repair surgery that was performed on Monday.  Patient states Dr. Chaparro prescribed laparoscopic right inguinal hernia repair surgery with no complications, but since then he has developed several complications from the surgery including difficulty urinating, constipation, and swelling in his right testicle that aches.  Patient denies any burning with urination, penile discharge, scrotal redness, back pain, penile lesions, nausea or vomiting, fever, bloody stools, or any other complaint.  Patient states he is able to pass gas but has not had a bowel movement since his surgery.      PAST MEDICAL HISTORY  Past Medical History:   Diagnosis Date    ADHD (attention deficit hyperactivity disorder)     Anxiety     Chest pain     Sharp and francesca.  Occurs about once a month and lasts a few seconds.  Pt states that he asked PCP about this in the past and was told this was most likely related to COPD.    COPD (chronic obstructive pulmonary disease)     Kidney stone     Sleep apnea     No CPAP used after tonsils and adenoids removed    Wears glasses          PAST SURGICAL HISTORY  Past Surgical History:   Procedure Laterality Date    ADENOIDECTOMY      INGUINAL HERNIA REPAIR Right 2025    Procedure: INGUINAL HERNIA REPAIR LAPAROSCOPIC WITH DAVINCI ROBOT;  Surgeon: Alphonso Chaparro MD;  Location: [facility];  Service: Robotics - DaVinci;  Laterality: Right;    TONSILLECTOMY      WISDOM TOOTH EXTRACTION Bilateral          FAMILY HISTORY  Family History   Problem Relation Age of Onset    No Known  Problems Mother     No Known Problems Father     ADD / ADHD Sister         Add or adhd cant remeber which one    ADD / ADHD Brother         Add         SOCIAL HISTORY  Social History     Socioeconomic History    Marital status:    Tobacco Use    Smoking status: Former     Current packs/day: 0.00     Average packs/day: 1 pack/day for 16.0 years (16.0 ttl pk-yrs)     Types: Cigarettes     Start date:      Quit date:      Years since quittin.3     Passive exposure: Past    Smokeless tobacco: Never   Vaping Use    Vaping status: Every Day    Substances: Nicotine    Devices: Disposable   Substance and Sexual Activity    Alcohol use: Yes     Comment: when out to eat usually    Drug use: Yes     Types: Marijuana     Comment: daily use of    Sexual activity: Defer         ALLERGIES  Patient has no known allergies.        REVIEW OF SYSTEMS  Review of Systems   Constitutional:  Negative for chills and fever.   HENT:  Negative for congestion and sore throat.    Respiratory:  Negative for cough and shortness of breath.    Cardiovascular:  Negative for chest pain.   Gastrointestinal:  Positive for constipation. Negative for abdominal pain, nausea and vomiting.   Genitourinary:  Negative for dysuria.   Musculoskeletal:  Negative for back pain.   Skin:  Negative for wound.   Neurological:  Negative for dizziness and headaches.   Psychiatric/Behavioral:  Negative for confusion.    All other systems reviewed and are negative.         All systems reviewed and negative except for those discussed in HPI.       PHYSICAL EXAM    I have reviewed the triage vital signs and nursing notes.    ED Triage Vitals   Temp Heart Rate Resp BP SpO2   25 1148 25 1148 25 1148 25 1148 25 1148   97.9 °F (36.6 °C) 93 16 108/79 96 %      Temp src Heart Rate Source Patient Position BP Location FiO2 (%)   25 1221 25 1221 25 1221 25 1221 --   Oral Monitor Lying Left arm        Physical  Exam  Vitals and nursing note reviewed. Exam conducted with a chaperone present.   Constitutional:       General: He is not in acute distress.     Appearance: Normal appearance. He is not ill-appearing, toxic-appearing or diaphoretic.   HENT:      Head: Normocephalic and atraumatic.      Right Ear: External ear normal.      Left Ear: External ear normal.      Nose: No congestion or rhinorrhea.      Mouth/Throat:      Mouth: Mucous membranes are moist.      Pharynx: Oropharynx is clear.   Eyes:      Conjunctiva/sclera: Conjunctivae normal.   Cardiovascular:      Rate and Rhythm: Normal rate.      Heart sounds: Normal heart sounds.   Pulmonary:      Effort: Pulmonary effort is normal. No respiratory distress.      Breath sounds: Normal breath sounds. No wheezing.   Abdominal:      Tenderness: There is no abdominal tenderness. There is no guarding or rebound.   Genitourinary:     Pubic Area: No rash.       Penis: Normal and circumcised.        Musculoskeletal:      Cervical back: Normal range of motion and neck supple.      Right lower leg: No edema.      Left lower leg: No edema.   Skin:     Findings: No rash.   Neurological:      Mental Status: He is alert.             LAB RESULTS  Recent Results (from the past 24 hours)   Comprehensive Metabolic Panel    Collection Time: 05/24/25  1:07 PM    Specimen: Blood   Result Value Ref Range    Glucose 99 65 - 99 mg/dL    BUN 12 6 - 20 mg/dL    Creatinine 1.08 0.76 - 1.27 mg/dL    Sodium 139 136 - 145 mmol/L    Potassium 3.9 3.5 - 5.2 mmol/L    Chloride 102 98 - 107 mmol/L    CO2 27.4 22.0 - 29.0 mmol/L    Calcium 10.0 8.6 - 10.5 mg/dL    Total Protein 7.5 6.0 - 8.5 g/dL    Albumin 4.7 3.5 - 5.2 g/dL    ALT (SGPT) 13 1 - 41 U/L    AST (SGOT) 19 1 - 40 U/L    Alkaline Phosphatase 46 39 - 117 U/L    Total Bilirubin 0.8 0.0 - 1.2 mg/dL    Globulin 2.8 gm/dL    A/G Ratio 1.7 g/dL    BUN/Creatinine Ratio 11.1 7.0 - 25.0    Anion Gap 9.6 5.0 - 15.0 mmol/L    eGFR 92.9 >60.0  mL/min/1.73   Lipase    Collection Time: 05/24/25  1:07 PM    Specimen: Blood   Result Value Ref Range    Lipase 34 13 - 60 U/L   CBC Auto Differential    Collection Time: 05/24/25  1:07 PM    Specimen: Blood   Result Value Ref Range    WBC 6.83 3.40 - 10.80 10*3/mm3    RBC 4.99 4.14 - 5.80 10*6/mm3    Hemoglobin 14.6 13.0 - 17.7 g/dL    Hematocrit 42.5 37.5 - 51.0 %    MCV 85.2 79.0 - 97.0 fL    MCH 29.3 26.6 - 33.0 pg    MCHC 34.4 31.5 - 35.7 g/dL    RDW 11.6 (L) 12.3 - 15.4 %    RDW-SD 36.4 (L) 37.0 - 54.0 fl    MPV 10.1 6.0 - 12.0 fL    Platelets 223 140 - 450 10*3/mm3    Neutrophil % 71.6 42.7 - 76.0 %    Lymphocyte % 18.3 (L) 19.6 - 45.3 %    Monocyte % 7.8 5.0 - 12.0 %    Eosinophil % 1.3 0.3 - 6.2 %    Basophil % 0.7 0.0 - 1.5 %    Immature Grans % 0.3 0.0 - 0.5 %    Neutrophils, Absolute 4.89 1.70 - 7.00 10*3/mm3    Lymphocytes, Absolute 1.25 0.70 - 3.10 10*3/mm3    Monocytes, Absolute 0.53 0.10 - 0.90 10*3/mm3    Eosinophils, Absolute 0.09 0.00 - 0.40 10*3/mm3    Basophils, Absolute 0.05 0.00 - 0.20 10*3/mm3    Immature Grans, Absolute 0.02 0.00 - 0.05 10*3/mm3    nRBC 0.0 0.0 - 0.2 /100 WBC   Urinalysis With Culture If Indicated - Urine, Clean Catch    Collection Time: 05/24/25  1:08 PM    Specimen: Urine, Clean Catch   Result Value Ref Range    Color, UA Yellow Yellow, Straw    Appearance, UA Clear Clear    pH, UA 7.5 5.0 - 8.0    Specific Gravity, UA 1.011 1.005 - 1.030    Glucose, UA Negative Negative    Ketones, UA Negative Negative    Bilirubin, UA Negative Negative    Blood, UA Negative Negative    Protein, UA Negative Negative    Leuk Esterase, UA Negative Negative    Nitrite, UA Negative Negative    Urobilinogen, UA 1.0 E.U./dL 0.2 - 1.0 E.U./dL       If labs were ordered, I independently reviewed the results and considered them in treating the patient.        RADIOLOGY  US Scrotum & Testicles  Result Date: 5/24/2025  PROCEDURE: US SCROTUM AND TESTICLES-  HISTORY: right testicle pain and swelling  s/p right inguinal hernia repair  PROCEDURE: Ultrasound images of the testicles were obtained bilaterally. Color Doppler images were obtained.  FINDINGS: The testicles are proper in size and echotexture bilaterally. Arterial flow is identified bilaterally. No intratesticular masses are identified. There are small bilateral hydroceles.      No evidence of testicular mass or torsion.    This report was signed and finalized on 5/24/2025 2:04 PM by Xochitl Cook MD.      CT Abdomen Pelvis Without Contrast  Result Date: 5/24/2025  PROCEDURE: CT ABDOMEN PELVIS WO CONTRAST-  HISTORY: postoperative constipation, pain from inguinal repair surgery, right testicle swelling  COMPARISON: 05/22/2025.  PROCEDURE: Axial images were obtained from the lung bases to the pubic symphysis by computed tomography. This study was performed with techniques to keep radiation doses as low as reasonably achievable, (ALARA). Individualized dose reduction techniques using automated exposure control or adjustment of mA and/or kV according to the patient size were employed.  FINDINGS:  ABDOMEN: The lung bases are clear. The heart is proper size. The limited non-contrast images of the liver are unremarkable. Gallbladder present with no CT visible stones. The spleen is unremarkable. No adrenal masses are seen. The aorta is normal in caliber. There is no significant free fluid or adenopathy.  There is no left nephrolithiasis. There is no hydronephrosis. There is a small nonobstructing right mid renal stone stable from prior exam.  PELVIS: The GI tract demonstrates moderate stool burden consistent with constipation. The appendix is identified and appears normal. The urinary bladder is mostly collapsed with no abnormality seen. There is no fluid or adenopathy. Prostate is normal in size.  There are a few small bubbles of air in the subcutaneous fat at the site of the right inguinal hernia repair; these are mildly decreased compared to prior, no  significant fluid collection is seen. Again noted is material of low right lower quadrant anteriorly consistent with recent postoperative change, similar to slightly improved compared to prior exam. No free air identified. Appendix is noted adjacent to the area of postoperative change but appears normal containing several bubbles of air and being normal in diameter. Scrotum is not included on this study. No suspicious groin adenopathy identified.      Moderate stool burden consistent with constipation.  Stable postoperative change right lower quadrant/groin.  Stable right nephrolithiasis without hydronephrosis.   CTDI: 4.6 mGy DLP:223.29 mGy.cm  This report was signed and finalized on 5/24/2025 2:02 PM by Xochitl Cook MD.        I ordered and independently reviewed the above noted radiographic studies.      I viewed images of US scrotum and testicles which showed no torsion, no testicular mass per my independent interpretation.    I viewed images of CT abdomen and pelvis which showed constipation, no fecal impaction per my independent interpretation    See radiologist's dictation for official interpretation.        PROCEDURES    Procedures    No orders to display       MEDICATIONS GIVEN IN ER    Medications - No data to display      MEDICAL DECISION MAKING, PROGRESS, and CONSULTS    All labs, if obtained, have been independently reviewed by me.  All radiology studies, if obtained, have been reviewed by me and the radiologist dictating the report.  All EKG's, if obtained, have been independently viewed and interpreted by me/my attending physician.      Discussion below represents my analysis of pertinent findings related to patient's condition, differential diagnosis, treatment plan and final disposition.    Patient is a 35-year-old male presenting to ER today for constipation postoperatively and right testicular swelling.   On exam in the presence of nurse chaperone patient does have some moderate right scrotal  swelling with minimal bluish skin discoloration, no acute right testicular tenderness, penis is normal, cremasteric reflex intact.  Low suspicion for torsion but will perform ultrasound of the scrotum and testes for rule out.  Patient has no acute abdominal tenderness to palpation, his surgical wound sites appear to be well-healing with no concern for infection or  incisional hernias or abscess.  But given his complaints of constipation, CT abdomen pelvis scan was added along with basic basic labs and urinalysis.    Lab work today overall bland and reassuring.  CT scan did show moderate stool burden consistent with constipation and no noted fecal impaction.   US scrotum and testicles unremarkable with no torsion or mass noted.  I suspect the right testicular torsion is related to the patient's recent right inguinal surgery with possible soft tissue swelling compressing on the pampiniform plexus of veins but will advise him to follow-up with general surgery on Monday regarding this.  I offered the patient a enema here in the ER which he declined, therefore will prescribe MiraLAX which I urged him to try first and GoLytely which I stated he could use should MiraLAX fail to result in a bowel movement.  Strict ED return precautions were explained and the patient verbalized understanding of and agreement with this plan of care.                     Differential diagnosis:    Differential diagnosis included but was not limited to post operative complication, testicular torsion, testicular mass, STI, UTI, fecal impaction, colitis      Additional sources:    - Discussed/ obtained information from independent historians: None    - External (non-ED) record review: Previous medical records reviewed including operative note from Dr. Alphonso Chaparro from 5/20/2025     - Chronic or social conditions impacting care: None    Orders placed during this visit:  Orders Placed This Encounter   Procedures    Chlamydia trachomatis, Neisseria  gonorrhoeae, PCR - Urine, Urine, Clean Catch    CT Abdomen Pelvis Without Contrast    US Scrotum & Testicles    Comprehensive Metabolic Panel    Lipase    Urinalysis With Culture If Indicated - Urine, Clean Catch    CBC Auto Differential    CBC & Differential         Additional orders considered but not ordered: None      ED Course:    Consultants: None                Shared Decision Making:  After my consideration of clinical presentation and any laboratory/radiology studies obtained, I discussed the findings with the patient/patient representative who is in agreement with the treatment plan and the final disposition.   Risks and benefits of discharge and/or observation/admission were discussed.       AS OF 14:49 EDT VITALS:    BP - 108/79  HR - 93  TEMP - 97.9 °F (36.6 °C) (Oral)  O2 SATS - 96%                  DIAGNOSIS  Final diagnoses:   Constipation, unspecified constipation type   Swelling of right testicle         DISPOSITION  Discharge      Please note that portions of this document were completed with voice recognition software.      Adriel Krause PA-C  05/24/25 [id number]

## 2025-05-27 LAB
C TRACH RRNA SPEC QL NAA+PROBE: NEGATIVE
N GONORRHOEA RRNA SPEC QL NAA+PROBE: NEGATIVE

## 2025-05-27 NOTE — PROGRESS NOTES
"Patient: Jose Lomax    YOB: 1990    Date: 05/28/2025    Primary Care Provider: Brittany Lamb APRN    Chief Complaint   Patient presents with    Post-op Hernia       History of present illness:  I saw the patient in the office today as a followup from their recent inguinal hernia repair which was performed on 05/19/2025.  Patient complains of pain and swelling in his right testicle.     Vital Signs:   Vitals:    05/28/25 1528   Pulse: 98   Temp: 98.2 °F (36.8 °C)   TempSrc: Infrared   SpO2: 95%   Weight: 70.8 kg (156 lb)   Height: 180.3 cm (70.98\")       Physical Exam:    Physical Exam:     General : no acute distress  Chest : clear bilaterally  COR : regular rate and rhythm  ABD :  soft nontender, all incisions healed nicely, very slight and resolving bruising of the right side of the scrotum     Assessment / Plan :    1. Post-operative state      I do not think the patient needs any further intervention, he was seen in the emergency room for this right sided testicular discomfort but this is since gotten better.  He has no residual symptomatology, there is no evidence of recurrence, I have asked the patient to see me back in the office if he has any further problems.    Electronically signed by Alphonso Chaparro MD  05/29/25                "

## 2025-05-28 ENCOUNTER — OFFICE VISIT (OUTPATIENT)
Dept: SURGERY | Facility: CLINIC | Age: 35
End: 2025-05-28
Payer: COMMERCIAL

## 2025-05-28 VITALS
HEIGHT: 71 IN | TEMPERATURE: 98.2 F | HEART RATE: 98 BPM | OXYGEN SATURATION: 95 % | WEIGHT: 156 LBS | BODY MASS INDEX: 21.84 KG/M2

## 2025-05-28 DIAGNOSIS — Z98.890 POST-OPERATIVE STATE: Primary | ICD-10-CM

## 2025-05-28 PROCEDURE — 99024 POSTOP FOLLOW-UP VISIT: CPT | Performed by: SURGERY

## 2025-05-28 RX ORDER — POLYETHYLENE GLYCOL-3350 AND ELECTROLYTES 236; 6.74; 5.86; 2.97; 22.74 G/274.31G; G/274.31G; G/274.31G; G/274.31G; G/274.31G
POWDER, FOR SOLUTION ORAL
COMMUNITY
Start: 2025-05-24

## 2025-05-29 ENCOUNTER — TELEPHONE (OUTPATIENT)
Dept: UROLOGY | Facility: CLINIC | Age: 35
End: 2025-05-29
Payer: COMMERCIAL

## 2025-05-29 NOTE — TELEPHONE ENCOUNTER
New pt for urinary retention, pt states he had surgery and was unable to urinate for a couple days and wore a cath, and he no longer has a problem, he is urinating just fine and wishes to cancel this appointment.

## 2025-06-18 ENCOUNTER — TELEPHONE (OUTPATIENT)
Dept: SURGERY | Facility: CLINIC | Age: 35
End: 2025-06-18
Payer: COMMERCIAL

## 2025-06-18 NOTE — TELEPHONE ENCOUNTER
"Patient is s/p right indirect inguinal hernia repair 5/19/25. He called stating that his dog ran across his lap and he heard \"a popping sound\" his excision is tender to touch. Please advise at [phone].  "

## 2025-06-18 NOTE — TELEPHONE ENCOUNTER
"Pt is s/p RIH repair performed 05/19/2025.  Pt c/o pain at site after \"jerking motion.\"  Pt will be seen in office tomorrow.  "

## 2025-06-19 ENCOUNTER — OFFICE VISIT (OUTPATIENT)
Dept: SURGERY | Facility: CLINIC | Age: 35
End: 2025-06-19
Payer: COMMERCIAL

## 2025-06-19 VITALS
TEMPERATURE: 98.6 F | DIASTOLIC BLOOD PRESSURE: 70 MMHG | SYSTOLIC BLOOD PRESSURE: 108 MMHG | WEIGHT: 166 LBS | BODY MASS INDEX: 23.24 KG/M2 | HEIGHT: 71 IN | OXYGEN SATURATION: 98 % | HEART RATE: 90 BPM

## 2025-06-19 DIAGNOSIS — Z98.890 POST-OPERATIVE STATE: Primary | ICD-10-CM

## 2025-06-19 PROCEDURE — 99024 POSTOP FOLLOW-UP VISIT: CPT | Performed by: SURGERY

## 2025-06-19 NOTE — PROGRESS NOTES
"Patient: Jose Lomax    YOB: 1990    Date: 06/19/2025    Primary Care Provider: Brittany Lamb APRN    Chief Complaint   Patient presents with    Post-op Hernia       History of present illness:  I saw the patient in the office today as a followup from their recent right inguinal hernia repair which was performed 05/19/2025.  Patient complains of pain at the site.        Vital Signs:   Vitals:    06/19/25 1230   BP: 108/70   Pulse: 90   Temp: 98.6 °F (37 °C)   TempSrc: Infrared   SpO2: 98%   Weight: 75.3 kg (166 lb)   Height: 180.3 cm (70.98\")     Physical Exam:     General : no acute distress  Chest : clear bilaterally  COR : regular rate and rhythm  ABD :  soft nontender, all incisions healed nicely    Assessment / Plan:    1. Post-operative state        I did discuss the situation with the patient today in the office and they have done well from their recent herniorraphy.  I have released the patient back to normal activity, they understand that they need to be careful about heavy lifting.  I need to see the patient back in the office only if they are having further problems, they know to call me if they are.    Electronically signed by Alphonso Chaparro MD  06/19/25              "

## 2025-07-01 ENCOUNTER — OFFICE VISIT (OUTPATIENT)
Dept: BEHAVIORAL HEALTH | Facility: CLINIC | Age: 35
End: 2025-07-01
Payer: COMMERCIAL

## 2025-07-01 DIAGNOSIS — F41.1 GENERALIZED ANXIETY DISORDER: Primary | ICD-10-CM

## 2025-07-01 DIAGNOSIS — F32.A DEPRESSION, UNSPECIFIED DEPRESSION TYPE: ICD-10-CM

## 2025-07-01 NOTE — PROGRESS NOTES
Follow Up Note       Date Encounter: 2025   Name: Jose Lomax  MRN: [id number]  : 1990    Time In: 10:31  Time Out: 11:01     Referring Provider: Brittany Lamb APRN    Chief Complaint: (F41.1) Generalized anxiety disorder    (F32.A) Depression, unspecified depression type     History of Present Illness:   Jose Lomax is a 35 y.o. male who is being seen today for follow up for individual Psychotherapy session. Therapist provided informed consent to patient explaining that confidentiality cannot be maintained if patient states intent, thought or plan to harm themself, someone else or if someone had harmed or plans to harm them. Patient stated understanding and consented for treatment.  Patient arrived on time. Patient is dressed appropriately  . Therapist used a Person Centered Approach with today's appointment. Patient was able to express  thoughts and emotions. Patient reports that he has missed his appointment with [facility] for ADHD. Patient reports he continues to be anxious and his thoughts race due to ADHD, but is doing ok. Patient denies having any suicidal thoughts, plans or intent. Patient reports using REACT skill is working when he is able to use. Patient continues to use his FMLA as needed for his anxiety. Patient continues to use his coping skills.       Subjective     Assessment Scores:   PHQ-9 Depression Screening  Little interest or pleasure in doing things? Several days   Feeling down, depressed, or hopeless? Several days   PHQ-2 Total Score 2   Trouble falling or staying asleep, or sleeping too much? Several days   Feeling tired or having little energy? More than half the days   Poor appetite or overeating? Several days   Feeling bad about yourself - or that you are a failure or have let yourself or your family down? Several days   Trouble concentrating on things, such as reading the newspaper or watching television? Several days   Moving or speaking so slowly that other  people could have noticed? Or the opposite - being so fidgety or restless that you have been moving around a lot more than usual? Not at all     Thoughts that you would be better off dead, or of hurting yourself in some way? Not at all   PHQ-9 Total Score 8   If you checked off any problems, how difficult have these problems made it for you to do your work, take care of things at home, or get along with other people? Somewhat difficult       FRAN-7  Feeling nervous, anxious or on edge: More than half the days  Not being able to stop or control worrying: Several days  Worrying too much about different things: More than half the days  Trouble Relaxing: Several days  Being so restless that it is hard to sit still: Not at all  Feeling afraid as if something awful might happen: Several days  Becoming easily annoyed or irritable: More than half the days  FRAN 7 Total Score: 9  If you checked any problems, how difficult have these problems made it for you to do your work, take care of things at home, or get along with other people: Somewhat difficult    Patient's Support Network Includes:  wife    Medications:     Current Outpatient Medications:     GaviLyte-G 236 g solution, MIX AN DRINK ONE TIME AS DIRECTED, Disp: , Rfl:     HYDROcodone-acetaminophen (NORCO) 7.5-325 MG per tablet, Take 1 tablet by mouth Every 6 (Six) Hours As Needed for Moderate Pain (Patient not taking: Reported on 6/19/2025), Disp: 15 tablet, Rfl: 0    ibuprofen (ADVIL,MOTRIN) 800 MG tablet, Take 1 tablet by mouth Every 8 (Eight) Hours As Needed for Mild Pain., Disp: 15 tablet, Rfl: 0    Allergies:   No Known Allergies     Objective       Mental Status Exam:   MENTAL STATUS EXAM   General Appearance:  Cleanly groomed and dressed  Eye Contact:  Good eye contact  Attitude:  Cooperative  Motor Activity:  Normal gait, posture  Muscle Strength:  Normal  Speech:  Normal rate, tone, volume  Language:  Other  Other Comment:  Appropriate  Mood and affect:   Normal, pleasant  Thought Process:  Logical and goal-directed  Associations/ Thought Content:  No delusions  Hallucinations:  None  Suicidal Ideations:  Not present  Homicidal Ideation:  Not present  Sensorium:  Alert and clear  Orientation:  Person, place and time  Immediate Recall, Recent, and Remote Memory:  Intact  Attention Span/ Concentration:  Good  Fund of Knowledge:  Appropriate for age and educational level  Intellectual Functioning:  Average range  Insight:  Good  Judgement:  Good  Reliability:  Good  Impulse Control:  Good       Assessment / Plan      Visit Diagnosis/Orders Placed This Visit:    ICD-10-CM ICD-9-CM   1. Generalized anxiety disorder  F41.1 300.02   2. Depression, unspecified depression type  F32.A 311        PLAN:     Prognosis: Good with ongoing treatment    Safety: Patient denies SI/HI.  Therapist and patient reviewed options for help including 911, 368 the suicide hotline, and going to the neared ER.  Therapist will continue to monitor.   Risk Assessment: Risk of self-harm acutely is low. Risk of self-harm chronically is also low, but could be further elevated in the event of treatment noncompliance and/or AODA.    Treatment Plan/Goals: Continue supportive psychotherapy efforts and medications as indicated. Treatment and medication options discussed during today's visit. Patient acknowledged and verbally consented to continue with current treatment plan and was educated on the importance of compliance with treatment and follow-up appointments. Patient seems reasonably able to adhere to treatment plan.      Assisted Patient in processing above session content; acknowledged and normalized patient’s thoughts, feelings, and concerns.      Allowed Patient to freely discuss issues  without interruption or judgement with unconditional positive regard, active listening skills, and empathy. Therapist provided a safe, confidential environment to facilitate the development of a positive  therapeutic relationship and encouraged open, honest communication. Assisted Patient in identifying risk factors which would indicate the need for higher level of care including thoughts to harm self or others and/or self-harming behavior and encouraged Patient to contact this office, call [phone], or present to the nearest emergency room should any of these events occur. Discussed crisis intervention services and means to access. Patient adamantly and convincingly denies current suicidal or homicidal ideation or perceptual disturbance. Assisted Patient in processing session content; acknowledged and normalized Patient’s thoughts, feelings, and concerns by utilizing a person-centered approach in efforts to build appropriate rapport and a positive therapeutic relationship with open and honest communication.     Follow Up:   Return in about 1 month (around 8/1/2025).       MARITZA Vaughan   [facility]

## 2025-07-09 ENCOUNTER — TELEPHONE (OUTPATIENT)
Dept: SURGERY | Facility: CLINIC | Age: 35
End: 2025-07-09
Payer: COMMERCIAL

## 2025-07-09 NOTE — TELEPHONE ENCOUNTER
Patient is s/p right inguinal hernia repair 5/19/25 with Dr. Chaparro. He called stating that he has sharp stabbing pains in his right abdomen. Scheduled him an appointment to see  in office on 7/15/25 at 12:55.

## 2025-07-22 ENCOUNTER — OFFICE VISIT (OUTPATIENT)
Dept: BEHAVIORAL HEALTH | Facility: CLINIC | Age: 35
End: 2025-07-22
Payer: COMMERCIAL

## 2025-07-22 DIAGNOSIS — F32.A DEPRESSION, UNSPECIFIED DEPRESSION TYPE: ICD-10-CM

## 2025-07-22 DIAGNOSIS — F41.1 GENERALIZED ANXIETY DISORDER: Primary | ICD-10-CM

## 2025-07-22 NOTE — PROGRESS NOTES
Follow Up Note       Date Encounter: 2025   Name: Jose Lomax  MRN: [id number]  : 1990    Time In: 10:30  Time Out: 11:25     Referring Provider: Brittany Lamb APRN    Chief Complaint: (F41.1) Generalized anxiety disorder    (F32.A) Depression, unspecified depression type     History of Present Illness:   Jose Lomax is a 35 y.o. male who is being seen today for follow up for individual Psychotherapy session. Therapist provided informed consent to patient explaining that confidentiality cannot be maintained if patient states intent, thought or plan to harm themself, someone else or if someone had harmed or plans to harm them. Patient stated understanding and consented for treatment.  Patient arrived on time. Patient is dressed appropriately  . Therapist used a Person Centered Approach with today's appointment. Patient was able to express  thoughts and emotions. Patient reports he is doing ok. Patient expressed continued anxiety brought on by trying to purchase  a home. Patient denies suicidal thoughts plans or intent.   Subjective     Assessment Scores:   PHQ-9 Depression Screening  Little interest or pleasure in doing things? Several days   Feeling down, depressed, or hopeless? Several days   PHQ-2 Total Score 2   Trouble falling or staying asleep, or sleeping too much? More than half the days   Feeling tired or having little energy? More than half the days   Poor appetite or overeating? More than half the days   Feeling bad about yourself - or that you are a failure or have let yourself or your family down? Several days   Trouble concentrating on things, such as reading the newspaper or watching television? Several days   Moving or speaking so slowly that other people could have noticed? Or the opposite - being so fidgety or restless that you have been moving around a lot more than usual? Several days     Thoughts that you would be better off dead, or of hurting yourself in some way?  Not at all   PHQ-9 Total Score 11   If you checked off any problems, how difficult have these problems made it for you to do your work, take care of things at home, or get along with other people? Somewhat difficult       FRAN-7  Feeling nervous, anxious or on edge: More than half the days  Not being able to stop or control worrying: Several days  Worrying too much about different things: Several days  Trouble Relaxing: Several days  Being so restless that it is hard to sit still: Several days  Feeling afraid as if something awful might happen: Several days  Becoming easily annoyed or irritable: More than half the days  FRAN 7 Total Score: 9  If you checked any problems, how difficult have these problems made it for you to do your work, take care of things at home, or get along with other people: Somewhat difficult    Patient's Support Network Includes:  wife    Medications:     Current Outpatient Medications:     GaviLyte-G 236 g solution, MIX AN DRINK ONE TIME AS DIRECTED, Disp: , Rfl:     HYDROcodone-acetaminophen (NORCO) 7.5-325 MG per tablet, Take 1 tablet by mouth Every 6 (Six) Hours As Needed for Moderate Pain (Patient not taking: Reported on 6/19/2025), Disp: 15 tablet, Rfl: 0    ibuprofen (ADVIL,MOTRIN) 800 MG tablet, Take 1 tablet by mouth Every 8 (Eight) Hours As Needed for Mild Pain., Disp: 15 tablet, Rfl: 0    Allergies:   No Known Allergies     Objective       Mental Status Exam:   MENTAL STATUS EXAM   General Appearance:  Cleanly groomed and dressed  Eye Contact:  Good eye contact  Attitude:  Cooperative  Motor Activity:  Fidgety and hyperactive  Muscle Strength:  Normal  Speech:  Normal rate, tone, volume  Language:  Other  Other Comment:  Appropriate  Mood and affect:  Normal, pleasant  Thought Process:  Logical and goal-directed  Associations/ Thought Content:  No delusions  Hallucinations:  None  Suicidal Ideations:  Not present  Homicidal Ideation:  Not present  Sensorium:  Alert and  clear  Orientation:  Person, place and time  Immediate Recall, Recent, and Remote Memory:  Intact  Attention Span/ Concentration:  Good  Fund of Knowledge:  Appropriate for age and educational level  Intellectual Functioning:  Average range  Insight:  Good  Judgement:  Good  Reliability:  Good  Impulse Control:  Good       Assessment / Plan      Visit Diagnosis/Orders Placed This Visit:    ICD-10-CM ICD-9-CM   1. Generalized anxiety disorder  F41.1 300.02   2. Depression, unspecified depression type  F32.A 311        PLAN:     Prognosis: Good with ongoing treatment    Safety: Patient denies SI/HI.  Therapist and patient reviewed options for help including 911, 988 the suicide hotline, and going to the neared ER.  Therapist will continue to monitor.   Risk Assessment: Risk of self-harm acutely is low. Risk of self-harm chronically is also low, but could be further elevated in the event of treatment noncompliance and/or AODA.    Treatment Plan/Goals: Continue supportive psychotherapy efforts and medications as indicated. Treatment and medication options discussed during today's visit. Patient acknowledged and verbally consented to continue with current treatment plan and was educated on the importance of compliance with treatment and follow-up appointments. Patient seems reasonably able to adhere to treatment plan.      Assisted client in processing above session content; acknowledged and normalized client’s thoughts, feelings, and concerns.    Explored the impact of recent life events on the client's emotional well-being, specifically addressing anxiety and constant thoughts.  Explored coping and distress tolerance skills to address emotional regulation, such as box breathing and Grounding techniques. Therapist provided positive reinforcement for client's openness in sharing vulnerable experiences, particularly when discussing anxiety.   Client continues to show progress on current treatment plan and no adjustments  are needed at this time. Therapist will continue to monitor.     Allowed Patient to freely discuss issues  without interruption or judgement with unconditional positive regard, active listening skills, and empathy. Therapist provided a safe, confidential environment to facilitate the development of a positive therapeutic relationship and encouraged open, honest communication. Assisted Patient in identifying risk factors which would indicate the need for higher level of care including thoughts to harm self or others and/or self-harming behavior and encouraged Patient to contact this office, call [phone], or present to the nearest emergency room should any of these events occur. Discussed crisis intervention services and means to access. Patient adamantly and convincingly denies current suicidal or homicidal ideation or perceptual disturbance. Assisted Patient in processing session content; acknowledged and normalized Patient’s thoughts, feelings, and concerns by utilizing a person-centered approach in efforts to build appropriate rapport and a positive therapeutic relationship with open and honest communication.     Follow Up:   Return in about 6 weeks (around 9/2/2025).       MARITZA Vaughan   [facility]

## 2025-07-28 ENCOUNTER — OFFICE VISIT (OUTPATIENT)
Age: 35
End: 2025-07-28
Payer: COMMERCIAL

## 2025-07-28 ENCOUNTER — TELEPHONE (OUTPATIENT)
Dept: SURGERY | Facility: CLINIC | Age: 35
End: 2025-07-28

## 2025-07-28 VITALS
HEART RATE: 81 BPM | WEIGHT: 159.1 LBS | OXYGEN SATURATION: 99 % | DIASTOLIC BLOOD PRESSURE: 74 MMHG | HEIGHT: 71 IN | BODY MASS INDEX: 22.27 KG/M2 | SYSTOLIC BLOOD PRESSURE: 124 MMHG

## 2025-07-28 DIAGNOSIS — F90.2 ADHD (ATTENTION DEFICIT HYPERACTIVITY DISORDER), COMBINED TYPE: Primary | ICD-10-CM

## 2025-07-28 DIAGNOSIS — F41.1 GENERALIZED ANXIETY DISORDER: ICD-10-CM

## 2025-07-28 DIAGNOSIS — F32.A DEPRESSION, UNSPECIFIED DEPRESSION TYPE: ICD-10-CM

## 2025-07-28 PROCEDURE — 90792 PSYCH DIAG EVAL W/MED SRVCS: CPT

## 2025-07-28 RX ORDER — METHYLPHENIDATE HYDROCHLORIDE 10 MG/1
10 TABLET ORAL 2 TIMES DAILY
Qty: 60 TABLET | Refills: 0 | Status: SHIPPED | OUTPATIENT
Start: 2025-07-28

## 2025-07-28 NOTE — TELEPHONE ENCOUNTER
CALLED FOR NO SHOW APPOINTMENT ON 7/28/25 WITH DR DAVILA, PATIENT STATED HE WAS SORRY HE FORGOT HE HAD AN APPT. HE STATED EVERYTHING IS FINE AND HE DOES NOT NEED TO RESCHEDULE AT THIS TIME. MAILED NO SHOW LETTER PER [employer].

## 2025-08-09 ENCOUNTER — HOSPITAL ENCOUNTER (EMERGENCY)
Facility: HOSPITAL | Age: 35
Discharge: HOME | End: 2025-08-09
Attending: EMERGENCY MEDICINE
Payer: COMMERCIAL

## 2025-08-09 VITALS
DIASTOLIC BLOOD PRESSURE: 85 MMHG | OXYGEN SATURATION: 98 % | RESPIRATION RATE: 16 BRPM | HEIGHT: 71 IN | HEART RATE: 54 BPM | BODY MASS INDEX: 22.4 KG/M2 | WEIGHT: 160 LBS | TEMPERATURE: 97.5 F | SYSTOLIC BLOOD PRESSURE: 118 MMHG

## 2025-08-09 DIAGNOSIS — K59.00 CONSTIPATION, UNSPECIFIED CONSTIPATION TYPE: Primary | ICD-10-CM

## 2025-08-09 LAB
HOLD SPECIMEN: NORMAL
HOLD SPECIMEN: NORMAL
WHOLE BLOOD HOLD COAG: NORMAL
WHOLE BLOOD HOLD SPECIMEN: NORMAL

## 2025-08-09 PROCEDURE — 99283 EMERGENCY DEPT VISIT LOW MDM: CPT | Performed by: EMERGENCY MEDICINE

## 2025-08-09 RX ORDER — MAGNESIUM CARB/ALUMINUM HYDROX 105-160MG
300 TABLET,CHEWABLE ORAL ONCE
Status: DISCONTINUED | OUTPATIENT
Start: 2025-08-09 | End: 2025-08-09

## 2025-08-09 RX ORDER — DOCUSATE SODIUM 100 MG/1
100 CAPSULE, LIQUID FILLED ORAL 2 TIMES DAILY
Qty: 10 CAPSULE | Refills: 0 | Status: SHIPPED | OUTPATIENT
Start: 2025-08-09 | End: 2025-08-09

## 2025-08-09 RX ORDER — SODIUM CHLORIDE 0.9 % (FLUSH) 0.9 %
10 SYRINGE (ML) INJECTION AS NEEDED
Status: DISCONTINUED | OUTPATIENT
Start: 2025-08-09 | End: 2025-08-09 | Stop reason: HOSPADM

## 2025-08-09 RX ORDER — DOCUSATE SODIUM 100 MG/1
100 CAPSULE, LIQUID FILLED ORAL 2 TIMES DAILY
Qty: 10 CAPSULE | Refills: 0 | Status: SHIPPED | OUTPATIENT
Start: 2025-08-09 | End: 2025-08-14

## 2025-08-25 ENCOUNTER — TELEMEDICINE (OUTPATIENT)
Age: 35
End: 2025-08-25
Payer: COMMERCIAL

## 2025-08-25 DIAGNOSIS — F90.2 ADHD (ATTENTION DEFICIT HYPERACTIVITY DISORDER), COMBINED TYPE: Primary | ICD-10-CM

## 2025-08-25 DIAGNOSIS — F41.1 GENERALIZED ANXIETY DISORDER: ICD-10-CM

## 2025-08-25 DIAGNOSIS — F32.A DEPRESSION, UNSPECIFIED DEPRESSION TYPE: ICD-10-CM

## 2025-08-25 PROCEDURE — 96127 BRIEF EMOTIONAL/BEHAV ASSMT: CPT

## 2025-08-25 PROCEDURE — 99214 OFFICE O/P EST MOD 30 MIN: CPT

## 2025-08-25 RX ORDER — DEXTROAMPHETAMINE SACCHARATE, AMPHETAMINE ASPARTATE, DEXTROAMPHETAMINE SULFATE AND AMPHETAMINE SULFATE 2.5; 2.5; 2.5; 2.5 MG/1; MG/1; MG/1; MG/1
10 TABLET ORAL 2 TIMES DAILY
Qty: 60 TABLET | Refills: 0 | Status: SHIPPED | OUTPATIENT
Start: 2025-08-25

## (undated) DEVICE — GOWN,PREVENTION PLUS,XL,ST,24/CS: Brand: MEDLINE

## (undated) DEVICE — COLUMN DRAPE

## (undated) DEVICE — ANTIBACTERIAL VIOLET BRAIDED (POLYGLACTIN 910), SYNTHETIC ABSORBABLE SUTURE: Brand: COATED VICRYL

## (undated) DEVICE — MEGA SUTURECUT ND: Brand: ENDOWRIST

## (undated) DEVICE — GLV SURG SENSICARE W/ALOE PF LF 8.5 STRL

## (undated) DEVICE — ANTIBACTERIAL UNDYED BRAIDED (POLYGLACTIN 910), SYNTHETIC ABSORBABLE SUTURE: Brand: COATED VICRYL

## (undated) DEVICE — ARM DRAPE

## (undated) DEVICE — BLADELESS OBTURATOR: Brand: WECK VISTA

## (undated) DEVICE — FORCE BIPOLAR: Brand: ENDOWRIST

## (undated) DEVICE — SOL IRR NACL 0.9PCT 1000ML

## (undated) DEVICE — INSUFFLATION NEEDLE TO ESTABLISH PNEUMOPERITONEUM.: Brand: INSUFFLATION NEEDLE

## (undated) DEVICE — SYR SLPTP 30CC

## (undated) DEVICE — SUCTION IRRIGATOR: Brand: ENDOWRIST

## (undated) DEVICE — TIP COVER ACCESSORY

## (undated) DEVICE — PATIENT RETURN ELECTRODE, SINGLE-USE, CONTACT QUALITY MONITORING, ADULT, WITH 9FT CORD, FOR PATIENTS WEIGING OVER 33LBS. (15KG): Brand: MEGADYNE

## (undated) DEVICE — SLV SCD CALF HEMOFORCE DVT THERP REPROC MD

## (undated) DEVICE — MONOPOLAR CURVED SCISSORS: Brand: ENDOWRIST

## (undated) DEVICE — ST TBG PNEUMOCLEAR EVAC SMOKE HIFLO

## (undated) DEVICE — MARKER,SKIN,WI/RULER AND LABELS: Brand: MEDLINE

## (undated) DEVICE — 40583 XL ADVANCED TRENDELENBURG POSITIONING KIT: Brand: 40583 XL ADVANCED TRENDELENBURG POSITIONING KIT

## (undated) DEVICE — RICH GENERAL LAPAROSCOPY: Brand: MEDLINE INDUSTRIES, INC.

## (undated) DEVICE — HDRST POSTN SLOTTED A/

## (undated) DEVICE — ADHS LIQ MASTISOL 2/3ML

## (undated) DEVICE — BAND BAG 36" X 36": Brand: STERIMED

## (undated) DEVICE — SEAL

## (undated) DEVICE — SYR LUERLOK 30CC

## (undated) DEVICE — CLNSR INST PREKLENZ SOAK/SHLD 6ML MD